# Patient Record
Sex: FEMALE | Race: BLACK OR AFRICAN AMERICAN | Employment: FULL TIME | URBAN - METROPOLITAN AREA
[De-identification: names, ages, dates, MRNs, and addresses within clinical notes are randomized per-mention and may not be internally consistent; named-entity substitution may affect disease eponyms.]

---

## 2017-08-12 ENCOUNTER — HOSPITAL ENCOUNTER (OUTPATIENT)
Dept: MAMMOGRAPHY | Age: 57
Discharge: HOME | End: 2017-08-12
Attending: OBSTETRICS & GYNECOLOGY
Payer: COMMERCIAL

## 2017-08-12 DIAGNOSIS — Z12.31 VISIT FOR SCREENING MAMMOGRAM: ICD-10-CM

## 2017-08-12 PROCEDURE — 77067 SCR MAMMO BI INCL CAD: CPT

## 2017-09-20 ENCOUNTER — OFFICE VISIT (OUTPATIENT)
Dept: ORTHOPEDIC SURGERY | Age: 57
End: 2017-09-20

## 2017-09-20 VITALS
TEMPERATURE: 98.3 F | HEART RATE: 84 BPM | DIASTOLIC BLOOD PRESSURE: 75 MMHG | HEIGHT: 62 IN | BODY MASS INDEX: 38.57 KG/M2 | OXYGEN SATURATION: 98 % | SYSTOLIC BLOOD PRESSURE: 144 MMHG | RESPIRATION RATE: 18 BRPM | WEIGHT: 209.6 LBS

## 2017-09-20 DIAGNOSIS — M54.2 NONSPECIFIC PAIN IN THE NECK REGION: ICD-10-CM

## 2017-09-20 DIAGNOSIS — M54.12 LEFT CERVICAL RADICULOPATHY: Primary | ICD-10-CM

## 2017-09-20 RX ORDER — HYDROGEN PEROXIDE 3 %
SOLUTION, NON-ORAL MISCELLANEOUS DAILY
COMMUNITY

## 2017-09-20 RX ORDER — TOPIRAMATE 25 MG/1
TABLET ORAL
Qty: 60 TAB | Refills: 1 | Status: SHIPPED | OUTPATIENT
Start: 2017-09-20 | End: 2018-08-13 | Stop reason: SINTOL

## 2017-09-20 NOTE — MR AVS SNAPSHOT
Visit Information Date & Time Provider Department Dept. Phone Encounter #  
 9/20/2017  2:30  [address],  [facility], [address] and [facility] [phone] [id number] Follow-up Instructions Return for MRI/CT f/u, med f/u, adjustment. Upcoming Health Maintenance Date Due Hepatitis C Screening 1960 DTaP/Tdap/Td series (1 - Tdap) 12/17/1983 PAP AKA CERVICAL CYTOLOGY 12/17/1983 FOBT Q 1 YEAR AGE 50-75 12/17/2012 INFLUENZA AGE 9 TO ADULT 8/1/2017 BREAST CANCER SCRN MAMMOGRAM 8/12/2019 Allergies as of 9/20/2017  Review Complete On: 9/20/2017 By: Sakshi Elizondo LPN Severity Noted Reaction Type Reactions Gabapentin  09/20/2017    Other (comments) Blurred Vision Pcn [Penicillins]  11/20/2012    Unknown (comments) Percocet [Oxycodone-acetaminophen]  11/20/2012    Rash  
 Sulfa (Sulfonamide Antibiotics)  11/20/2012    Unknown (comments) Current Immunizations  Never Reviewed No immunizations on file. Not reviewed this visit You Were Diagnosed With   
  
 Codes Comments Left cervical radiculopathy    -  Primary ICD-10-CM: M54.12 
ICD-9-CM: 723.4 Nonspecific pain in the neck region     ICD-10-CM: M54.2 ICD-9-CM: 723.1 Vitals BP Pulse Temp Resp Height(growth percentile) Weight(growth percentile) 144/75 84 98.3 °F (36.8 °C) (Oral) 18 5' 2\" (1.575 m) 209 lb 9.6 oz (95.1 kg) SpO2 BMI Smoking Status 98% 38.34 kg/m2 Never Smoker BMI and BSA Data Body Mass Index Body Surface Area  
 38.34 kg/m 2 2.04 m 2 Preferred Pharmacy Pharm[place] [phone] [facility] Your Updated Medication List  
  
   
This list is accurate as of: 9/20/17  3:45 PM.  Always use your most recent med list.  
  
  
  
  
 Anson Rape Take  by mouth. ARMOUR THYROID 15 mg tablet Generic drug:  thyroid (Pork) Take 45 mg by mouth. ibuprofen 800 mg tablet Commonly known as:  MOTRIN Take  by mouth.  
  
 losartan 25 mg tablet Commonly known as:  COZAAR Take 100 mg by mouth daily. MSM PO Take  by mouth. NexIUM 20 mg capsule Generic drug:  esomeprazole Take  by mouth daily. topiramate 25 mg tablet Commonly known as:  TOPAMAX Take 1 tab PO QHS x1 week then may increase to 2 tabs PO QHS ZyrTEC 10 mg Cap Generic drug:  Cetirizine Take  by mouth. Prescriptions Sent to Pharmacy Refills  
 topiramate (TOPAMAX) 25 mg tablet 1 Sig: Take 1 tab PO QHS x1 week then may increase to 2 tabs PO QHS Class: Normal  
 Pharmacy: [address] #: [phone] We Performed the Following AMB POC XRAY, SPINE, CERVICAL; 2 OR 3 [04519 CPT(R)] Follow-up Instructions Return for MRI/CT f/u, med f/u, adjustment. To-Do List   
 09/20/2017 Imaging:  MRI CERV SPINE WO CONT   
  
 09/29/2017 8:00 AM  
  Appointment with [facility] MRI RM 1 at [address] [place] [address] ([phone]) GENERAL INSTRUCTIONS  Bring information (ID card) if you have any medically implanted devices. You will be required to lie still while the procedure is being performed. Remove any jewelry (including body piercing, hairpins) prior to MRI. If you have had a creatinine level drawn within the past 30 days, please bring most recent results to your appt. Bring any films, CD's, and reports related to your study with you on the day of your exam.  This only includes studies done outside of [address] [place] [address], [place] [address], [place], and [facility]. Bring a complete list of all medications you are currently taking to include prescriptions, over-the-counter meds, herbals, vitamins & any dietary supplements. If you were given medications for claustrophobia or anxiety, please arrange to have someone drive you to your appointment.   QUESTIONS Notify the MRI Department if you have any questions concerning your study. Mora Iván  [phone] [address] Mitali Aggarwal - [phone] Referral Information Referral ID Referred By Referred To  
  
 [id number] [place] Not Available Visits Status Start Date End Date 1 New Request 17 If your referral has a status of pending review or denied, additional information will be sent to support the outcome of this decision. Patient Instructions MyChart Activation Thank you for requesting access to [employer]. Please follow the instructions below to securely access and download your online medical record. [employer] allows you to send messages to your doctor, view your test results, renew your prescriptions, schedule appointments, and more. How Do I Sign Up? 1. In your internet browser, go to [email or web address] 
2. Click on the First Time User? Click Here link in the Sign In box. You will be redirect to the New Member Sign Up page. 3. Enter your [employer] Access Code exactly as it appears below. You will not need to use this code after youve completed the sign-up process. If you do not sign up before the expiration date, you must request a new code. [employer] Access Code: [email or web address] Expires: 2017 10:21 AM (This is the date your [employer] access code will ) 4. Enter the last four digits of your Social Security Number (xxxx) and Date of Birth (mm/dd/yyyy) as indicated and click Submit. You will be taken to the next sign-up page. 5. Create a [facility] ID. This will be your [employer] login ID and cannot be changed, so think of one that is secure and easy to remember. 6. Create a [employer] password. You can change your password at any time. 7. Enter your Password Reset Question and Answer. This can be used at a later time if you forget your password. 8. Enter your e-mail address.  You will receive e-mail notification when new information is available in [employer]. 9. Click Sign Up. You can now view and download portions of your medical record. 10. Click the Download Summary menu link to download a portable copy of your medical information. Additional Information If you have questions, please visit the Frequently Asked Questions section of the [employer] website at [email or web address] Remember, [employer] is NOT to be used for urgent needs. For medical emergencies, dial 911. Neuropathic Pain: Care Instructions Your Care Instructions Neuropathic pain is caused by pressure on or damage to your nerves. It's often simply called nerve pain. Some people feel this type of pain all the time. For others, it comes and goes. Diabetes, shingles, or an injury can cause nerve pain. Many people say the pain feels sharp, burning, or stabbing. But some people feel it as a dull ache. In some cases, it makes your skin very sensitive. So touch, pressure, and other sensations that did not hurt before may now cause pain. It's important to know that this kind of pain is real and can affect your quality of life. It's also important to know that treatment can help. Treatment includes pain medicines, exercise, and physical therapy. Medicines can help reduce the number of pain signals that travel over the nerves. This can make the painful areas less sensitive. It can also help you sleep better and improve your mood. But medicines are only one part of successful treatment. Most people do best with more than one kind of treatment. Your doctor may recommend that you try cognitive-behavioral therapy and stress management. Or, if needed, you may decide to try to quit smoking, lower your blood pressure, or better control blood sugar. These kinds of healthy changes can also make a difference. If you feel that your treatment is not working, talk to your doctor.  And be sure to tell your doctor if you think you might be depressed or anxious. These are common problems that can also be treated. Follow-up care is a key part of your treatment and safety. Be sure to make and go to all appointments, and call your doctor if you are having problems. It's also a good idea to know your test results and keep a list of the medicines you take. How can you care for yourself at home? · Be safe with medicines. Read and follow all instructions on the label. ¨ If the doctor gave you a prescription medicine for pain, take it as prescribed. ¨ If you are not taking a prescription pain medicine, ask your doctor if you can take an over-the-counter medicine. · Save hard tasks for days when you have less pain. Follow a hard task with an easy task. And remember to take breaks. · Relax, and reduce stress. You may want to try deep breathing or meditation. These can help. · Keep moving. Gentle, daily exercise can help reduce pain. Your doctor or physical therapist can tell you what type of exercise is best for you. This may include walking, swimming, and stationary biking. It may also include stretches and range-of-motion exercises. · Try heat, cold packs, and massage. · Get enough sleep. Constant pain can make you more tired. If the pain makes it hard to sleep, talk with your doctor. · Think positively. Your thoughts can affect your pain. Do fun things to distract yourself from the pain. See a movie, read a book, listen to music, or spend time with a friend. · Keep a pain diary. Try to write down how strong your pain is and what it feels like. Also try to notice and write down how your moods, thoughts, sleep, activities, and medicine affect your pain. These notes can help you and your doctor find the best ways to treat your pain. Reducing constipation caused by pain medicine Pain medicines often cause constipation. To reduce constipation: 
· Include fruits, vegetables, beans, and whole grains in your diet each day. These foods are high in fiber. · Drink plenty of fluids, enough so that your urine is light yellow or clear like water. If you have kidney, heart, or liver disease and have to limit fluids, talk with your doctor before you increase the amount of fluids you drink. · Get some exercise every day. Build up slowly to 30 to 60 minutes a day on 5 or more days of the week. · Take a fiber supplement, such as Citrucel or Metamucil, every day if needed. Read and follow all instructions on the label. · Schedule time each day for a bowel movement. Having a daily routine may help. Take your time and do not strain when having a bowel movement. · Ask your doctor about a laxative. The goal is to have one easy bowel movement every 1 to 2 days. Do not let constipation go untreated for more than 3 days. When should you call for help? Call your doctor now or seek immediate medical care if: 
· You feel sad, anxious, or hopeless for more than a few days. This could mean you are depressed. Depression is common in people who have a lot of pain. But it can be treated. · You have trouble with bowel movements, such as: 
¨ No bowel movement in 3 days. ¨ Blood in the anal area, in your stool, or on the toilet paper. ¨ Diarrhea for more than 24 hours. Watch closely for changes in your health, and be sure to contact your doctor if: 
· Your pain is getting worse. · You can't sleep because of pain. · You are very worried or anxious about your pain. · You have trouble taking your pain medicine. · You have any concerns about your pain medicine or its side effects. · You have vomiting or cramps for more than 2 hours. Where can you learn more? Go to http://pj-adam.info/. Enter V772 in the search box to learn more about \"Neuropathic Pain: Care Instructions. \" Current as of: October 14, 2016 Content Version: 11.3 © 9816-3756 [employer].  Care instructions adapted under license by [address] (which disclaims liability or warranty for this information). If you have questions about a medical condition or this instruction, always ask your healthcare professional. [address] any warranty or liability for your use of this information. Introducing [facility]! Mayur Millertrell introduces [employer] patient portal. Now you can access parts of your medical record, email your doctor's office, and request medication refills online. 1. In your internet browser, go to [email or web address] 2. Click on the First Time User? Click Here link in the Sign In box. You will see the New Member Sign Up page. 3. Enter your [employer] Access Code exactly as it appears below. You will not need to use this code after youve completed the sign-up process. If you do not sign up before the expiration date, you must request a new code. · [employer] Access Code: [email or web address] Expires: 11/5/2017 10:21 AM 
 
4. Enter the last four digits of your Social Security Number (xxxx) and Date of Birth (mm/dd/yyyy) as indicated and click Submit. You will be taken to the next sign-up page. 5. Create a [employer] ID. This will be your [employer] login ID and cannot be changed, so think of one that is secure and easy to remember. 6. Create a [employer] password. You can change your password at any time. 7. Enter your Password Reset Question and Answer. This can be used at a later time if you forget your password. 8. Enter your e-mail address. You will receive e-mail notification when new information is available in [address] 9. Click Sign Up. You can now view and download portions of your medical record. 10. Click the Download Summary menu link to download a portable copy of your medical information. If you have questions, please visit the Frequently Asked Questions section of the [employer] website.  Remember, [employer] is NOT to be used for urgent needs. For medical emergencies, dial 911. Now available from your iPhone and Android! Please provide this summary of care documentation to your next provider. Your primary care clinician is listed as Boo Cantrell. If you have any questions after today's visit, please call [phone].

## 2017-09-20 NOTE — PATIENT INSTRUCTIONS
[employer] Activation    Thank you for requesting access to [employer]. Please follow the instructions below to securely access and download your online medical record. [employer] allows you to send messages to your doctor, view your test results, renew your prescriptions, schedule appointments, and more. How Do I Sign Up? 1. In your internet browser, go to [email or web address]  2. Click on the First Time User? Click Here link in the Sign In box. You will be redirect to the New Member Sign Up page. 3. Enter your [employer] Access Code exactly as it appears below. You will not need to use this code after youve completed the sign-up process. If you do not sign up before the expiration date, you must request a new code. [employer] Access Code: [email or web address]  Expires: 2017 10:21 AM (This is the date your [employer] access code will )    4. Enter the last four digits of your Social Security Number (xxxx) and Date of Birth (mm/dd/yyyy) as indicated and click Submit. You will be taken to the next sign-up page. 5. Create a [employer] ID. This will be your [employer] login ID and cannot be changed, so think of one that is secure and easy to remember. 6. Create a [employer] password. You can change your password at any time. 7. Enter your Password Reset Question and Answer. This can be used at a later time if you forget your password. 8. Enter your e-mail address. You will receive e-mail notification when new information is available in [address]. 9. Click Sign Up. You can now view and download portions of your medical record. 10. Click the Download Summary menu link to download a portable copy of your medical information. Additional Information    If you have questions, please visit the Frequently Asked Questions section of the [employer] website at [email or web address] Remember, [employer] is NOT to be used for urgent needs. For medical emergencies, dial 911.          Neuropathic Pain: Care Instructions  Your Care Instructions  Neuropathic pain is caused by pressure on or damage to your nerves. It's often simply called nerve pain. Some people feel this type of pain all the time. For others, it comes and goes. Diabetes, shingles, or an injury can cause nerve pain. Many people say the pain feels sharp, burning, or stabbing. But some people feel it as a dull ache. In some cases, it makes your skin very sensitive. So touch, pressure, and other sensations that did not hurt before may now cause pain. It's important to know that this kind of pain is real and can affect your quality of life. It's also important to know that treatment can help. Treatment includes pain medicines, exercise, and physical therapy. Medicines can help reduce the number of pain signals that travel over the nerves. This can make the painful areas less sensitive. It can also help you sleep better and improve your mood. But medicines are only one part of successful treatment. Most people do best with more than one kind of treatment. Your doctor may recommend that you try cognitive-behavioral therapy and stress management. Or, if needed, you may decide to try to quit smoking, lower your blood pressure, or better control blood sugar. These kinds of healthy changes can also make a difference. If you feel that your treatment is not working, talk to your doctor. And be sure to tell your doctor if you think you might be depressed or anxious. These are common problems that can also be treated. Follow-up care is a key part of your treatment and safety. Be sure to make and go to all appointments, and call your doctor if you are having problems. It's also a good idea to know your test results and keep a list of the medicines you take. How can you care for yourself at home? · Be safe with medicines. Read and follow all instructions on the label. ¨ If the doctor gave you a prescription medicine for pain, take it as prescribed.   ¨ If you are not taking a prescription pain medicine, ask your doctor if you can take an over-the-counter medicine. · Save hard tasks for days when you have less pain. Follow a hard task with an easy task. And remember to take breaks. · Relax, and reduce stress. You may want to try deep breathing or meditation. These can help. · Keep moving. Gentle, daily exercise can help reduce pain. Your doctor or physical therapist can tell you what type of exercise is best for you. This may include walking, swimming, and stationary biking. It may also include stretches and range-of-motion exercises. · Try heat, cold packs, and massage. · Get enough sleep. Constant pain can make you more tired. If the pain makes it hard to sleep, talk with your doctor. · Think positively. Your thoughts can affect your pain. Do fun things to distract yourself from the pain. See a movie, read a book, listen to music, or spend time with a friend. · Keep a pain diary. Try to write down how strong your pain is and what it feels like. Also try to notice and write down how your moods, thoughts, sleep, activities, and medicine affect your pain. These notes can help you and your doctor find the best ways to treat your pain. Reducing constipation caused by pain medicine  Pain medicines often cause constipation. To reduce constipation:  · Include fruits, vegetables, beans, and whole grains in your diet each day. These foods are high in fiber. · Drink plenty of fluids, enough so that your urine is light yellow or clear like water. If you have kidney, heart, or liver disease and have to limit fluids, talk with your doctor before you increase the amount of fluids you drink. · Get some exercise every day. Build up slowly to 30 to 60 minutes a day on 5 or more days of the week. · Take a fiber supplement, such as Citrucel or Metamucil, every day if needed. Read and follow all instructions on the label. · Schedule time each day for a bowel movement.  Having a daily routine may help. Take your time and do not strain when having a bowel movement. · Ask your doctor about a laxative. The goal is to have one easy bowel movement every 1 to 2 days. Do not let constipation go untreated for more than 3 days. When should you call for help? Call your doctor now or seek immediate medical care if:  · You feel sad, anxious, or hopeless for more than a few days. This could mean you are depressed. Depression is common in people who have a lot of pain. But it can be treated. · You have trouble with bowel movements, such as:  ¨ No bowel movement in 3 days. ¨ Blood in the anal area, in your stool, or on the toilet paper. ¨ Diarrhea for more than 24 hours. Watch closely for changes in your health, and be sure to contact your doctor if:  · Your pain is getting worse. · You can't sleep because of pain. · You are very worried or anxious about your pain. · You have trouble taking your pain medicine. · You have any concerns about your pain medicine or its side effects. · You have vomiting or cramps for more than 2 hours. Where can you learn more? Go to http://pj-adam.info/. Enter Q660 in the search box to learn more about \"Neuropathic Pain: Care Instructions. \"  Current as of: October 14, 2016  Content Version: 11.3  © 5678-1074 [employer]. Care instructions adapted under license by [employer] (which disclaims liability or warranty for this information). If you have questions about a medical condition or this instruction, always ask your healthcare professional. [place] [address] any warranty or liability for your use of this information.

## 2017-09-20 NOTE — PROGRESS NOTES
PAULINE ENTERED PER DR. Es Chapman AS DOCUMENTED ON BLUE SHEET: Topamax 25 mg Take 1 tab PO QHS x1 week then may increase to 2 tabs PO QHS  Disp 60 1RF

## 2017-09-20 NOTE — PROGRESS NOTES
Edy [facility]ormart[facility].  [address], [address]  [place], [address]  Phone: [phone]  Fax: [phone]        Judy Saba  : 1960  PCP: Skye Whitaker MD  2017    NEW PATIENT      ASSESSMENT AND PLAN     Diagnoses and all orders for this visit:    1. Left cervical radiculopathy    2. Nonspecific pain in the neck region  -     [22801] C Spine 2-3V       1. I recommended hourly positional changes . 2. A cervical MRI was ordered for evaluation of myeloradiculopathy. 3. Restart Topamax 25 mg 2 tabs  ramp. 4. She will continue with ibuprofen as needed. 5. Pt instructed to avoid heavy lifting and overhead activity/lifting. 6.  HEP  Follow-up Disposition: Not on File      CHIEF COMPLAINT  Sara Maynard is seen today for complaints of neck and right arm. HISTORY OF PRESENT ILLNESS  Sara Maynard is a 49 y.o. female. She was last evaluated in  for low back pain. At that time she was on 25 mg Topamax with benefit. She has history of right sided facet joint injections in  and . Pt reports no pain in the lower back since receiving her last injection. Pt complains of frequent neck pain that gradually came on 3-6 months ago. She reports that she has experienced pain in the left arm extending form the hand to the elbow. Pt states that she often feels a cold sensation on the left hand and pain in the 3rd and 4th digits. She reports that occasionally she has difficulty sleeping due to throbbing pain in the left elbow. Pt states that she started to experience right arm symptoms a couple months ago. She admits that she has been dropping things. Of note, she is right handed. Pt denies any numbness in the legs at this time. She admits that she sits in front of a computer throughout her workday. Pt has been using ibuprofen with relief. She no longer takes Topamax.  Denies persistent fevers, chills, weight changes, neurogenic bowel or bladder symptoms. No recent infections/CP/SOB. Pain Assessment  9/20/2017   Location of Pain Neck;Arm   Severity of Pain 1   Quality of Pain Dull;Aching; Sharp   Quality of Pain Comment stabbing   Frequency of Pain Intermittent   Aggravating Factors (No Data)   Aggravating Factors Comment unsure (per patient)   Relieving Factors (No Data)   Relieving Factors Comment Ibuprofen         PAST MEDICAL HISTORY   Past Medical History:   Diagnosis Date    Endocrine disease     Hypothyroid    GERD (gastroesophageal reflux disease)     Hypertension     Thyroid disease        Past Surgical History:   Procedure Laterality Date    HX OOPHORECTOMY Right 07/2011    HX OVARIAN CYST REMOVAL      HX TONSILLECTOMY         MEDICATIONS      Current Outpatient Prescriptions   Medication Sig Dispense Refill    esomeprazole (NEXIUM) 20 mg capsule Take  by mouth daily.  METHYLSULFONYLMETHANE (MSM PO) Take  by mouth.  ibuprofen (MOTRIN) 800 mg tablet Take  by mouth.  ZOLPIDEM TARTRATE (AMBIEN PO) Take  by mouth.  losartan (COZAAR) 25 mg tablet Take 100 mg by mouth daily.  Thyroid, Pork, (ARMOUR THYROID) 15 mg Tab Take 45 mg by mouth.  Cetirizine (ZYRTEC) 10 mg cap Take  by mouth.            ALLERGIES    Allergies   Allergen Reactions    Gabapentin Other (comments)     Blurred Vision    Pcn [Penicillins] Unknown (comments)    Percocet [Oxycodone-Acetaminophen] Rash    Sulfa (Sulfonamide Antibiotics) Unknown (comments)          SOCIAL HISTORY    Social History     Social History    Marital status:      Spouse name: N/A    Number of children: N/A    Years of education: N/A     Social History Main Topics    Smoking status: Never Smoker    Smokeless tobacco: Never Used    Alcohol use No    Drug use: None    Sexual activity: Not Asked     Other Topics Concern    None     Social History Narrative     Social History Narrative      Problem Relation Age of Onset    Heart Attack Mother    Lindsey Noonan Diabetes Father     Stroke Father     Bleeding Prob Father     Breast Cancer Maternal Aunt        REVIEW OF SYSTEMS  Review of Systems   Constitutional: Negative. HENT: Negative for hearing loss. Eyes: Negative for blurred vision and double vision. Respiratory: Negative for cough, shortness of breath and wheezing. Cardiovascular: Negative for chest pain, palpitations and PND. Gastrointestinal: Negative for abdominal pain, heartburn, nausea and vomiting. Genitourinary: Negative. Musculoskeletal: Positive for neck pain. Negative for back pain, falls, joint pain and myalgias. Neurological: Positive for tingling and sensory change. Negative for dizziness and headaches. PHYSICAL EXAMINATION  Visit Vitals    /75    Pulse 84    Temp 98.3 °F (36.8 °C) (Oral)    Resp 18    Ht 5' 2\" (1.575 m)    Wt 209 lb 9.6 oz (95.1 kg)    SpO2 98%    BMI 38.34 kg/m2       Constitutional:  Well developed, well nourished, in no acute distress. Psychiatric: Affect and mood are appropriate. Integumentary: No rashes or abrasions noted on exposed areas. Cardiovascular/Peripheral Vascular: Intact l pulses. No peripheral edema is noted. Lymphatic:  No evidence of lymphedema. No cervical lymphadenopathy. SPINE/MUSCULOSKELETAL EXAM    Cervical spine:  Neck is midline, FF. Pt has a trigger point in the left upper trapezius. No focal atrophy is noted. ROM pain free. Shoulder ROM intact. Negative Spurling's sign. Positive Tinel's sign at the left wrist and elbow. Negative Covarrubias's sign. Sensation grossly intact to light touch. Lumbar spine:   No rash, ecchymosis, or gross obliquity. No fasciculations. No focal atrophy is noted. No pain with hip ROM. No tenderness to palpation at the sciatic notch. SI joints non-tender. Trochanters non tender. Sensation grossly intact to light touch.       MOTOR:      Biceps  Triceps Deltoids Wrist Ext Wrist Flex Hand Intrin   Right +4/5 +4/5 +4/5 +4/5 +4/5 4/5   Left +4/5 +4/5 +4/5 +4/5 +4/5 4/5        Hip Flex  Quads Hamstrings Ankle DF EHL Ankle PF   Right +4/5 +4/5 +4/5 +4/5 +4/5 +4/5   Left +4/5 +4/5 +4/5 +4/5 +4/5 +4/5     DTRs are brisk 2+ in the biceps, triceps and brachioradialis and DTRs are 2+ in the patella and Achilles. No difficulty with tandem gait. Ambulation without assistive device. FWB. RADIOGRAPHS  Cervical spine 2V xray films reviewed:  1) Nonspecific straightening of cervical lordosis. Written by Jennifer Posey, as dictated by Jeannine Martinez MD.    Ms. [facility] may have a reminder for a \"due or due soon\" health maintenance. I have asked that she contact her primary care provider for follow-up on this health maintenance.

## 2017-09-29 ENCOUNTER — HOSPITAL ENCOUNTER (OUTPATIENT)
Age: 57
Discharge: HOME | End: 2017-09-29
Attending: PHYSICAL MEDICINE & REHABILITATION
Payer: COMMERCIAL

## 2017-09-29 DIAGNOSIS — M54.12 LEFT CERVICAL RADICULOPATHY: ICD-10-CM

## 2017-09-29 PROCEDURE — 72141 MRI NECK SPINE W/O DYE: CPT

## 2017-10-04 ENCOUNTER — OFFICE VISIT (OUTPATIENT)
Dept: ORTHOPEDIC SURGERY | Age: 57
End: 2017-10-04

## 2017-10-04 VITALS
HEART RATE: 82 BPM | DIASTOLIC BLOOD PRESSURE: 89 MMHG | BODY MASS INDEX: 38.46 KG/M2 | WEIGHT: 209 LBS | TEMPERATURE: 98.5 F | RESPIRATION RATE: 18 BRPM | OXYGEN SATURATION: 97 % | HEIGHT: 62 IN | SYSTOLIC BLOOD PRESSURE: 149 MMHG

## 2017-10-04 DIAGNOSIS — M54.12 CERVICAL RADICULOPATHY: Primary | ICD-10-CM

## 2017-10-04 NOTE — MR AVS SNAPSHOT
Visit Information Date & Time Provider Department Dept. Phone Encounter #  
 10/4/2017  1:00  [address],  [facility], [address] [facility] [phone] [id number] Follow-up Instructions Return if symptoms worsen or fail to improve. Upcoming Health Maintenance Date Due Hepatitis C Screening 1960 DTaP/Tdap/Td series (1 - Tdap) 12/17/1983 PAP AKA CERVICAL CYTOLOGY 12/17/1983 FOBT Q 1 YEAR AGE 50-75 12/17/2012 INFLUENZA AGE 9 TO ADULT 8/1/2017 BREAST CANCER SCRN MAMMOGRAM 8/12/2019 Allergies as of 10/4/2017  Review Complete On: 10/4/2017 By: Gil Santamaria MD  
  
 Severity Noted Reaction Type Reactions Gabapentin  09/20/2017    Other (comments) Blurred Vision Pcn [Penicillins]  11/20/2012    Unknown (comments) Percocet [Oxycodone-acetaminophen]  11/20/2012    Rash  
 Sulfa (Sulfonamide Antibiotics)  11/20/2012    Unknown (comments) Current Immunizations  Never Reviewed No immunizations on file. Not reviewed this visit You Were Diagnosed With   
  
 Codes Comments Cervical radiculopathy    -  Primary ICD-10-CM: M54.12 
ICD-9-CM: 723.4 Vitals BP Pulse Temp Resp Height(growth percentile) Weight(growth percentile) 149/89 82 98.5 °F (36.9 °C) (Oral) 18 5' 2\" (1.575 m) 209 lb (94.8 kg) SpO2 BMI Smoking Status 97% 38.23 kg/m2 Never Smoker BMI and BSA Data Body Mass Index Body Surface Area  
 38.23 kg/m 2 2.04 m 2 [facility]erred Pharm[place] [facility] [place] [place] [phone] Your Updated Medication List  
  
   
This list is accurate as of: 10/4/17  1:31 PM.  Always use your most recent med list.  
  
  
  
  
 Dorthea Tad Take  by mouth. ARMOUR THYROID 15 mg tablet Generic drug:  thyroid (Pork) Take 45 mg by mouth. ibuprofen 800 mg tablet Commonly known as:  MOTRIN Take  by mouth. losartan 25 mg tablet Commonly known as:  COZAAR Take 100 mg by mouth daily. MSM PO Take  by mouth. NexIUM 20 mg capsule Generic drug:  esomeprazole Take  by mouth daily. topiramate 25 mg tablet Commonly known as:  TOPAMAX Take 1 tab PO QHS x1 week then may increase to 2 tabs PO QHS ZyrTEC 10 mg Cap Generic drug:  Cetirizine Take  by mouth. We Performed the Following REFERRAL TO PHYSICAL THERAPY [BDB84 Custom] Comments:  
 Cervical Radiculitis Trial of manual traction progress to mechanical traction. Evaluate for HTU Follow-up Instructions Return if symptoms worsen or fail to improve. Referral Information Referral ID Referred By Referred To  
  
 [id number] Dee Wade Not Available Visits Status Start Date End Date 1 New Request 10/4/17 10/4/18 If your referral has a status of pending review or denied, additional information will be sent to support the outcome of this decision. Patient Instructions Cervical Disc Disease: Care Instructions Your Care Instructions Cervical disc disease results from damage, disease, or wear and tear to the discs between the bones (vertebra) in your neck. The discs act as shock absorbers for the spine and keep the spine flexible. When a disc is damaged, it can bulge out and press against the nerve roots or spinal cord. This is sometimes called a herniated or \"slipped disc. \" This pressure can cause pain and numbness or tingling in your arms and hands. It can also cause weakness in your legs. An accident can damage a disc and cause it to break open (rupture). Aging and hard physical work can also cause damage to cervical discs. The first treatments for cervical disc disease include physical therapy, special neck exercises, heat, and pain medicine. If these fail, your doctor may inject steroids and pain medicine into your neck.  Surgery is usually done only if other treatments have not worked. Follow-up care is a key part of your treatment and safety. Be sure to make and go to all appointments, and call your doctor if you are having problems. It's also a good idea to know your test results and keep a list of the medicines you take. How can you care for yourself at home? · Take pain medicines exactly as directed. ¨ If the doctor gave you a prescription medicine for pain, take it as prescribed. ¨ If you are not taking a prescription pain medicine, ask your doctor if you can take an over-the-counter medicine. · Don't spend too long in one position. Take short breaks to move around and change positions. · Wear a seat belt and shoulder harness when you are in a car. · Sleep with a pillow under your head and neck that keeps your neck straight. · Follow your doctor's instructions for gentle neck-stretching exercises. · Do not smoke. Smoking can slow healing of your discs. If you need help quitting, talk to your doctor about stop-smoking programs and medicines. These can increase your chances of quitting for good. · Avoid strenuous work or exercise until your doctor says it is okay. When should you call for help? Call 911 anytime you think you may need emergency care. For example, call if: 
· You are unable to move an arm or a leg at all. Call your doctor now or seek immediate medical care if: 
· You have new or worse symptoms in your arms, legs, chest, belly, or buttocks. Symptoms may include: ¨ Numbness or tingling. ¨ Weakness. ¨ Pain. · You lose bladder or bowel control. Watch closely for changes in your health, and be sure to contact your doctor if: 
· You are not getting better as expected. Where can you learn more? Go to http://pj-adam.info/. Enter N118 in the search box to learn more about \"Cervical Disc Disease: Care Instructions. \" Current as of: March 21, 2017 Content Version: 11.3 © 8312-5547 [employer], [employer]. Care instructions adapted under license by [employer] (which disclaims liability or warranty for this information). If you have questions about a medical condition or this instruction, always ask your healthcare professional. [address] any warranty or liability for your use of this information. Introducing [facility]! Montse Gutierrez introduces Hypejar patient portal. Now you can access parts of your medical record, email your doctor's office, and request medication refills online. 1. In your internet browser, go to [email or web address] 2. Click on the First Time User? Click Here link in the Sign In box. You will see the New Member Sign Up page. 3. Enter your Hypejar Access Code exactly as it appears below. You will not need to use this code after youve completed the sign-up process. If you do not sign up before the expiration date, you must request a new code. · Hypejar Access Code: [email or web address] Expires: 11/5/2017 10:21 AM 
 
4. Enter the last four digits of your Social Security Number (xxxx) and Date of Birth (mm/dd/yyyy) as indicated and click Submit. You will be taken to the next sign-up page. 5. Create a Hypejar ID. This will be your Hypejar login ID and cannot be changed, so think of one that is secure and easy to remember. 6. Create a Hypejar password. You can change your password at any time. 7. Enter your Password Reset Question and Answer. This can be used at a later time if you forget your password. 8. Enter your e-mail address. You will receive e-mail notification when new information is available in [address] 9. Click Sign Up. You can now view and download portions of your medical record. 10. Click the Download Summary menu link to download a portable copy of your medical information.  
 
If you have questions, please visit the Frequently Asked Questions section of the KPA. Remember, DigiMeldhart is NOT to be used for urgent needs. For medical emergencies, dial 911. Now available from your iPhone and Android! Please provide this summary of care documentation to your next provider. Your primary care clinician is listed as Gordon Gerardo. If you have any questions after today's visit, please call [phone].

## 2017-10-04 NOTE — PATIENT INSTRUCTIONS
Cervical Disc Disease: Care Instructions  Your Care Instructions    Cervical disc disease results from damage, disease, or wear and tear to the discs between the bones (vertebra) in your neck. The discs act as shock absorbers for the spine and keep the spine flexible. When a disc is damaged, it can bulge out and press against the nerve roots or spinal cord. This is sometimes called a herniated or \"slipped disc. \" This pressure can cause pain and numbness or tingling in your arms and hands. It can also cause weakness in your legs. An accident can damage a disc and cause it to break open (rupture). Aging and hard physical work can also cause damage to cervical discs. The first treatments for cervical disc disease include physical therapy, special neck exercises, heat, and pain medicine. If these fail, your doctor may inject steroids and pain medicine into your neck. Surgery is usually done only if other treatments have not worked. Follow-up care is a key part of your treatment and safety. Be sure to make and go to all appointments, and call your doctor if you are having problems. It's also a good idea to know your test results and keep a list of the medicines you take. How can you care for yourself at home? · Take pain medicines exactly as directed. ¨ If the doctor gave you a prescription medicine for pain, take it as prescribed. ¨ If you are not taking a prescription pain medicine, ask your doctor if you can take an over-the-counter medicine. · Don't spend too long in one position. Take short breaks to move around and change positions. · Wear a seat belt and shoulder harness when you are in a car. · Sleep with a pillow under your head and neck that keeps your neck straight. · Follow your doctor's instructions for gentle neck-stretching exercises. · Do not smoke. Smoking can slow healing of your discs. If you need help quitting, talk to your doctor about stop-smoking programs and medicines.  These can increase your chances of quitting for good. · Avoid strenuous work or exercise until your doctor says it is okay. When should you call for help? Call 911 anytime you think you may need emergency care. For example, call if:  · You are unable to move an arm or a leg at all. Call your doctor now or seek immediate medical care if:  · You have new or worse symptoms in your arms, legs, chest, belly, or buttocks. Symptoms may include:  ¨ Numbness or tingling. ¨ Weakness. ¨ Pain. · You lose bladder or bowel control. Watch closely for changes in your health, and be sure to contact your doctor if:  · You are not getting better as expected. Where can you learn more? Go to http://pj-adam.info/. Enter N118 in the search box to learn more about \"Cervical Disc Disease: Care Instructions. \"  Current as of: March 21, 2017  Content Version: 11.3  © 4422-5388 [employer]. Care instructions adapted under license by [employer] (which disclaims liability or warranty for this information). If you have questions about a medical condition or this instruction, always ask your healthcare professional. [address] any warranty or liability for your use of this information.

## 2017-10-04 NOTE — PROGRESS NOTES
[facility] [address]  [address], [address]  [place], [facility] [address]  Phone: [phone]  Fax: [phone]        Jabier Thompson  : 1960  PCP: Micheal Jason MD    PROGRESS NOTE      ASSESSMENT AND PLAN    Diagnoses and all orders for this visit:    1. Cervical radiculopathy  -     REFERRAL TO PHYSICAL THERAPY    1. Advised to continue HEP. 2. Referral to physical therapy. 3. Discussed cervical epidural as possible treatment option. 4. Recommend pt to take Topamax 50 mg QHS, may wean again as symptoms improve. 5. Continue Tylenol PRN. 6. Urgent symptoms discussed with pt.   7. Released from specialty care to PCP. 8. May have refills through PCP (if agreeable). 9. Given information on cervical DDD. Follow-up Disposition:  Return if symptoms worsen or fail to improve. HISTORY OF PRESENT ILLNESS  Forrest Curtis is a 49 y.o. female. Pt presents to the office for a f/u visit for neck pain. Last visit pt was sent to have a cervical spine MRI. Images reviewed with the pt. Stenosis by measurement, images show preserved CSF mantle, no HNP. Pt continues to have neck pain. Pt reports pain now will radiate into her RUE, previously was having pain into her LUE. She states that two days after her office visit, her pain began to go into her RUE, no longer has pain in her LUE. She does have some paresthesias in her hand. Pt notes that she  Has been dropping objects with her hands. Pt continues to have weakness in her UE. Pt states that she has a hard time driving as she has limited ROM in her neck due to pain. She has been experiencing a cold sensation in her RT hand. Pt states she has been using Topamax 25 mg without relief. Pt never increased her Topamax and only took it for a week before she stopped taking it. She is taking Tylenol arthritis two tabs a day with benefit. Pt has not tried traction on her neck.  Denies persistent fevers, chills, weight changes, neurogenic bowel or bladder symptoms. Pt denies recent ED visits or hospitalizations. Pain Assessment  10/4/2017   Location of Pain Neck;Arm   Location Modifiers Right   Severity of Pain 5   Quality of Pain Aching   Quality of Pain Comment -   Frequency of Pain Constant   Aggravating Factors (No Data)   Aggravating Factors Comment weather, sleeping   Relieving Factors (No Data)   Relieving Factors Comment tylenol arthiritis           MRI Results (most recent):    Results from Hospital Encounter encounter on 09/29/17   MRI CERV SPINE WO CONT   Narrative EXAM:  MRI CERV SPINE WO CONT    INDICATION:   Increased neck pain L UE N/T/W    COMPARISON: None. TECHNIQUE: MR imaging of the cervical spine was performed including sagittal T1,  T2, STIR;  axial GRE, T2, T1. Contrast was not administered. FINDINGS:  There is normal alignment of the cervical spine. Vertebral body heights are  maintained. Marrow signal is normal. Mild degenerative discogenic disease C4-C5  and C5-C6. The craniocervical junction is intact. The course, caliber, and  signal intensity of the spinal cord are normal.      C2/3:  No significant central canal or foraminal stenosis    C3/4:  No significant central canal or foraminal stenosis. C4/5:  Mild disc osteophyte complex. Moderate left and mild right foraminal  stenosis. Mild narrowing of the central canal, midline AP diameter is 8.8 mm. C5/6:  Mild disc osteophyte complex with small right paracentral disc  protrusion. Mild left and moderate right foraminal stenosis. Mild central canal  stenosis, midline AP diameter is 7.5 mm    C6/7:  No significant central canal or foraminal stenosis. C7/T1:  No significant central canal or foraminal stenosis. Impression IMPRESSION:      Mild disc osteophyte complex with probable small right paracentral disc  protrusion at C5-C6. Moderate right foraminal stenosis.     Moderate left foraminal stenosis secondary to mild osteophyte complex at C4-C5. No significant central canal stenosis. PAST MEDICAL HISTORY   Past Medical History:   Diagnosis Date    Endocrine disease     Hypothyroid    GERD (gastroesophageal reflux disease)     Hypertension     Thyroid disease        Past Surgical History:   Procedure Laterality Date    HX OOPHORECTOMY Right 07/2011    HX OVARIAN CYST REMOVAL      HX TONSILLECTOMY     . MEDICATIONS    Current Outpatient Prescriptions   Medication Sig Dispense Refill    esomeprazole (NEXIUM) 20 mg capsule Take  by mouth daily.  METHYLSULFONYLMETHANE (MSM PO) Take  by mouth.  topiramate (TOPAMAX) 25 mg tablet Take 1 tab PO QHS x1 week then may increase to 2 tabs PO QHS 60 Tab 1    ZOLPIDEM TARTRATE (AMBIEN PO) Take  by mouth.  losartan (COZAAR) 25 mg tablet Take 100 mg by mouth daily.  Thyroid, Pork, (ARMOUR THYROID) 15 mg Tab Take 45 mg by mouth.  Cetirizine (ZYRTEC) 10 mg cap Take  by mouth.  ibuprofen (MOTRIN) 800 mg tablet Take  by mouth. ALLERGIES  Allergies   Allergen Reactions    Gabapentin Other (comments)     Blurred Vision    Pcn [Penicillins] Unknown (comments)    Percocet [Oxycodone-Acetaminophen] Rash    Sulfa (Sulfonamide Antibiotics) Unknown (comments)          SOCIAL HISTORY    Social History     Social History    Marital status:      Spouse name: N/A    Number of children: N/A    Years of education: N/A     Occupational History    Not on file.      Social History Main Topics    Smoking status: Never Smoker    Smokeless tobacco: Never Used    Alcohol use No    Drug use: Not on file    Sexual activity: Not on file     Other Topics Concern    Not on file     Social History Narrative       FAMILY HISTORY  Family History   Problem Relation Age of Onset    Heart Attack Mother     Diabetes Father     Stroke Father     Bleeding Prob Father     Breast Cancer Maternal Aunt        REVIEW OF SYSTEMS  Review of Systems   Constitutional: Negative for chills, fever and weight loss. Respiratory: Negative for shortness of breath. Cardiovascular: Negative for chest pain. Gastrointestinal: Negative for constipation. Negative for fecal incontinence   Genitourinary: Negative for dysuria. Negative for urinary incontinence   Musculoskeletal:        Per HPI   Skin: Negative for rash. Neurological: Positive for tingling and focal weakness. Negative for dizziness, tremors and headaches. Endo/Heme/Allergies: Does not bruise/bleed easily. Psychiatric/Behavioral: The patient does not have insomnia. PHYSICAL EXAMINATION  Visit Vitals    /89    Pulse 82    Temp 98.5 °F (36.9 °C) (Oral)    Resp 18    Ht 5' 2\" (1.575 m)    Wt 209 lb (94.8 kg)    SpO2 97%    BMI 38.23 kg/m2         Accompanied by self. Constitutional:  Well developed, well nourished, in no acute distress. Psychiatric: Affect and mood are appropriate. Integumentary: No rashes or abrasions noted on exposed areas. Cardiovascular/Peripheral Vascular: Intact l pulses. No peripheral edema is noted. Lymphatic:  No evidence of lymphedema. No cervical lymphadenopathy. SPINE/MUSCULOSKELETAL EXAM    Cervical spine:  Neck is midline. Normal muscle tone. No focal atrophy is noted. Shoulder ROM intact. Tenderness to palpation of RT cervical paraspinals. Tenderness to palpation of bilateral upper trapezii. Negative Spurling's sign. Negative Tinel's sign. Negative Covarrubias's sign. Sensation grossly intact to light touch. MOTOR:      Biceps  Triceps Deltoids Wrist Ext Wrist Flex Hand Intrin   Right +4/5 +4/5 +4/5 +4/5 +4/5 +4/5   Left +4/5 +4/5 +4/5 +4/5 +4/5 +4/5     Ambulation without assistive device. FWB. Written by Roosevelt Landers, as dictated by Santina Hammans, MD.    I, Dr. Santina Hammans, MD, confirm that all documentation is accurate.       Ms. Abhishek Lucas may have a reminder for a \"due or due soon\" health maintenance. I have asked that she contact her primary care provider for follow-up on this health maintenance.

## 2017-10-09 ENCOUNTER — HOSPITAL ENCOUNTER (OUTPATIENT)
Dept: PHYSICAL THERAPY | Age: 57
Discharge: HOME | End: 2017-10-09
Payer: COMMERCIAL

## 2017-10-09 PROCEDURE — 97161 PT EVAL LOW COMPLEX 20 MIN: CPT

## 2017-10-09 PROCEDURE — 97110 THERAPEUTIC EXERCISES: CPT

## 2017-10-09 NOTE — PROGRESS NOTES
[facility] - [facility], [address] [place] [address]  [phone] fax  Plan of Care/ Statement of Necessity for Physical Therapy Services     Patient name: Nick Man Start of Care: 10/9/2017   Referral source: Christine Nelson MD : 1960    Medical Diagnosis: Neck pain [M54.2]  Radiculopathy, cervical region [M54.12]   Onset Date: Within 3 months    Treatment Diagnosis: neck pain   Prior Hospitalization: see medical history Provider#: [id number]   Medications: Verified on Patient summary List    Comorbidities: arthritis, high blood pressure, thyroid problems, history of back pain   Prior Level of Function: Functionally independent, lives alone, 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 Assessment/ key information: Patient is a pleasant Right handed 49year old female who presents with complaints of neck pain over the past three months. 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 MRI reveals cervical DDD. Due to her pain Patient reports difficulty with turning her head to drive, pain at night, intermittent tingling in the UEs, and increased pain with having her arms at her sides. At initial evaluation Patient demonstrates the following cervical AROM: flexion 45 (some muscle pull), extension 20 (increased neck/UE pain), rotation Right 35 (pain Right) Left 40, lateral flexion Right 32 Left 18. UE strength grossly 5/5 Left and 4+/5 Right with slight pain. Negative Spurling's compression test. Decreased pain with cervical manual distraction/traction. Tender to palpation in the cervical and upper back musculature, Right more than Left.  Patient is a good rehab candidate based on premorbid status, and will benefit from skilled physical therapy in order to address the above deficits. Evaluation Complexity History MEDIUM  Complexity : 1-2 comorbidities / personal factors will impact the outcome/ POC ; Examination LOW Complexity : 1-2 Standardized tests and measures addressing body structure, function, activity limitation and / or participation in recreation  ;Presentation LOW Complexity : Stable, uncomplicated  ;Clinical Decision Making MEDIUM Complexity : FOTO score of 26-74  Overall Complexity Rating: LOW   Problem List: pain affecting function, decrease ROM, decrease strength, decrease ADL/ functional abilitiies, decrease activity tolerance, decrease flexibility/ joint mobility and decrease transfer abilities   Treatment Plan may include any combination of the following: Therapeutic exercise, Therapeutic activities, Neuromuscular re-education, Physical agent/modality, Manual therapy, Aquatic therapy, Patient education, Self Care training and Other: cervical mechanical traction  Patient / Family readiness to learn indicated by: asking questions, trying to perform skills and interest  Persons(s) to be included in education: patient (P)  Barriers to Learning/Limitations: None  Patient Goal (s): pain relief and/or healing to point where surgery will not be required  Patient Self Reported Health Status: good  Rehabilitation Potential: good    Short Term Goals: To be accomplished in 1 weeks:  Goal: Patient will be independent and compliant with HEP in order to progress toward long term goals. Status at last note/certification: Issued and reviewed  Long Term Goals: To be accomplished in 10 treatments:  Goal: Patient will improve FOTO assessment score to 72 in order to indicate improved functional abilities. Status at last note/certification: 61  Goal: Patient will improve Right shoulder flexion/abduction strength to 5/5 without pain in order to improve ease of assisting father.   Status at last note/certification: 4+/5  Goal: Patient will improve cervical rotation/lateral flexion AROM by at least 10 degrees bilaterally without increase in pain in order to improve ease of driving job tasks. Status at last note/certification: Rotation Right 35 (pain Right) Left 40, lateral flexion Right 32 Left 18  Goal: Patient will report at least 70% improvement overall in order to progress toward personal goals. Status at last note/certification: n/a    Frequency / Duration: Patient to be seen 2 times per week for 10 treatments with increase to 3 times per week if needed. Patient/ Caregiver education and instruction: Diagnosis, prognosis, exercises   [x]  Plan of care has been reviewed with KIARRA Mendez, PT 10/9/2017 9:25 AM  _____________________________________________________________________  I certify that the above Therapy Services are being furnished while the patient is under my care. I agree with the treatment plan and certify that this therapy is necessary.     Physician's Signature:____________________  Date:__________Time:______    Please sign and return to [facility] - [place] [address] [place]  [phone] fax

## 2017-10-09 NOTE — PROGRESS NOTES
PT DAILY TREATMENT NOTE     Patient Name: Christin Solorzano  Date:10/9/2017  : 1960  [x]  Patient  Verified  Payor: Aaron Cota / Plan: [place] [employer]  CAPITATED PT / Product Type: Commerical /    In time:835  Out time:925  Total Treatment Time (min): 50  Visit #: 1 of 10    Treatment Area: Neck pain [M54.2]  Radiculopathy, cervical region [M54.12]    SUBJECTIVE  Pain Level (0-10 scale): 1  Any medication changes, allergies to medications, adverse drug reactions, diagnosis change, or new procedure performed?: [x] No    [] Yes (see summary sheet for update)  Subjective functional status/changes:   [] No changes reported      OBJECTIVE    Modality rationale:    Min Type Additional Details    [] Estim:  []Unatt       []IFC  []Premod                        []Other:  []w/ice   []w/heat  Position:  Location:    [] Estim: []Att    []TENS instruct  []NMES                    []Other:  []w/US   []w/ice   []w/heat  Position:  Location:    []  Traction: [] Cervical       []Lumbar                       [] Prone          []Supine                       []Intermittent   []Continuous Lbs:  [] before manual  [] after manual    []  Ultrasound: []Continuous   [] Pulsed                           []1MHz   []3MHz W/cm2:  Location:    []  Iontophoresis with dexamethasone         Location: [] Take home patch   [] In clinic    []  Ice     []  heat  []  Ice massage  []  Laser   []  Anodyne Position:  Location:    []  Laser with stim  []  Other:  Position:  Location:    []  Vasopneumatic Device Pressure:       [] lo [] med [] hi   Temperature: [] lo [] med [] hi   [] Skin assessment post-treatment:  []intact []redness- no adverse reaction    []redness - adverse reaction:     42 min [x]Eval                  []Re-Eval       8 min Therapeutic Exercise:  [] See flow sheet :HEP   Rationale: increase ROM and increase strength to improve the patients ability to perform daily tasks          With   [] TE   [] TA   [] neuro   [] other: Patient Education: [x] Review HEP    [] Progressed/Changed HEP based on:   [] positioning   [] body mechanics   [] transfers   [] heat/ice application    [] other:      Other Objective/Functional Measures:      Pain Level (0-10 scale) post treatment: 0    ASSESSMENT/Changes in Function:     Patient will continue to benefit from skilled PT services to modify and progress therapeutic interventions, address functional mobility deficits, address ROM deficits, address strength deficits, analyze and address soft tissue restrictions, analyze and cue movement patterns, analyze and modify body mechanics/ergonomics, assess and modify postural abnormalities, address imbalance/dizziness and instruct in home and community integration to attain remaining goals.      [x]  See Plan of Care  []  See progress note/recertification  []  See Discharge Summary         Progress towards goals / Updated goals:  See POC    PLAN  []  Upgrade activities as tolerated     [x]  Continue plan of care  []  Update interventions per flow sheet       []  Discharge due to:_  []  Other:_      Berta Jessica, PT 10/9/2017  9:24 AM    Future Appointments  Date Time Provider Nolan Wang   10/11/2017 4:00  [address] [facility]   10/16/2017 4:00  [address] [facility]   10/18/2017 4:00  [address] [facility]   10/23/2017 4:00 PM Berta Jessica, PT [facility]   10/25/2017 4:30 PM Hannah Nagy, PT [facility]   10/30/2017 4:00 PM Berta Jessica, PT [facility]

## 2017-10-11 ENCOUNTER — HOSPITAL ENCOUNTER (OUTPATIENT)
Dept: PHYSICAL THERAPY | Age: 57
Discharge: HOME | End: 2017-10-11
Payer: COMMERCIAL

## 2017-10-11 PROCEDURE — 97110 THERAPEUTIC EXERCISES: CPT

## 2017-10-11 PROCEDURE — 97140 MANUAL THERAPY 1/> REGIONS: CPT

## 2017-10-11 NOTE — PROGRESS NOTES
PT DAILY TREATMENT NOTE     Patient Name: Epifanio Starkey  Date:10/11/2017  : 1960  [x]  Patient  Verified  Payor: Josee Gracia / Plan: [employer]  CAPITATED PT / Product Type: Commerical /    In time:4:00  Out time:5:15  Total Treatment Time (min): 75  Visit #: 2 of 10    Treatment Area: Neck pain [M54.2]  Radiculopathy, cervical region [M54.12]    SUBJECTIVE  Pain Level (0-10 scale): 4  Any medication changes, allergies to medications, adverse drug reactions, diagnosis change, or new procedure performed?: [x] No    [] Yes (see summary sheet for update)  Subjective functional status/changes:   [] No changes reported  I have some discomfort in m R > L UE today down to elbow    OBJECTIVE    Modality rationale: decrease pain and increase tissue extensibility to improve the patients ability to perform daily tasks   Min Type Additional Details    [] Estim:  []Unatt       []IFC  []Premod                        []Other:  []w/ice   []w/heat  Position:  Location:    [] Estim: []Att    []TENS instruct  []NMES                    []Other:  []w/US   []w/ice   []w/heat  Position:  Location:    []  Traction: [] Cervical       []Lumbar                       [] Prone          []Supine                       []Intermittent   []Continuous Lbs:  [] before manual  [] after manual    []  Ultrasound: []Continuous   [] Pulsed                           []1MHz   []3MHz W/cm2:  Location:    []  Iontophoresis with dexamethasone         Location: [] Take home patch   [] In clinic   10 []  Ice     [x]  heat  []  Ice massage  []  Laser   []  Anodyne Position: supine  Location: cs    []  Laser with stim  []  Other:  Position:  Location:    []  Vasopneumatic Device Pressure:       [] lo [] med [] hi   Temperature: [] lo [] med [] hi   [x] Skin assessment post-treatment:  [x]intact []redness- no adverse reaction    []redness - adverse reaction:       45 min Therapeutic Exercise:  [] See flow sheet :   Rationale: increase ROM and increase strength to improve the patients ability to care for father, ADL's    10 min Manual Therapy:  STM to cs, scalenes and UT; SOR   Rationale: decrease pain, increase ROM and increase tissue extensibility to improve ease of driving work tasks          With   [] TE   [] TA   [] neuro   [] other: Patient Education: [x] Review HEP    [] Progressed/Changed HEP based on:   [] positioning   [] body mechanics   [] transfers   [] heat/ice application    [] other:      Other Objective/Functional Measures: initiated ex program     Pain Level (0-10 scale) post treatment:  1    ASSESSMENT/Changes in Function: First follow-up after eval    Patient will continue to benefit from skilled PT services to modify and progress therapeutic interventions, address functional mobility deficits, address ROM deficits, address strength deficits, analyze and address soft tissue restrictions, analyze and cue movement patterns, analyze and modify body mechanics/ergonomics, assess and modify postural abnormalities and address imbalance/dizziness to attain remaining goals. []  See Plan of Care  []  See progress note/recertification  []  See Discharge Summary         Progress towards goals / Updated goals:  Goal: Patient will be independent and compliant with HEP in order to progress toward long term goals. Status at last note/certification: Issued and reviewed  Met  Long Term Goals: To be accomplished in 10 treatments:  Goal: Patient will improve FOTO assessment score to 72 in order to indicate improved functional abilities. Status at last note/certification: 61  Goal: Patient will improve Right shoulder flexion/abduction strength to 5/5 without pain in order to improve ease of assisting father. Status at last note/certification: 4+/5  Goal: Patient will improve cervical rotation/lateral flexion AROM by at least 10 degrees bilaterally without increase in pain in order to improve ease of driving job tasks.   Status at last note/certification: Rotation Right 35 (pain Right) Left 40, lateral flexion Right 32 Left 18  Goal: Patient will report at least 70% improvement overall in order to progress toward personal goals.   Status at last note/certification: n/a    PLAN  [x]  Upgrade activities as tolerated     []  Continue plan of care  []  Update interventions per flow sheet       []  Discharge due to:_  []  Other:_      Flo Gresham, PTA 10/11/2017  5:13 PM    Future Appointments  Date Time Provider Nolan Wang   10/16/2017 4:00  [address] [facility]   10/18/2017 4:00  [address] [facility]   10/23/2017 4:00 PM Juanjo Narayan, PT [facility]   10/25/2017 4:30 PM Hannah Nagy, PT [facility]   10/30/2017 4:00 PM Juanjo Narayan, PT Andrea Coto

## 2017-10-16 ENCOUNTER — HOSPITAL ENCOUNTER (OUTPATIENT)
Dept: PHYSICAL THERAPY | Age: 57
Discharge: HOME | End: 2017-10-16
Payer: COMMERCIAL

## 2017-10-16 PROCEDURE — 97110 THERAPEUTIC EXERCISES: CPT

## 2017-10-16 NOTE — PROGRESS NOTES
PT DAILY TREATMENT NOTE     Patient Name: Izaiah Vidales  Date:10/16/2017  : 1960  [x]  Patient  Verified  Payor: Shannan Salgado / Plan: [place] [employer]  CAPITATED PT / Product Type: Commerical /    In time:4:00  Out time: 4;55  Total Treatment Time (min): 55  Visit #: 3 of 10    Treatment Area: Neck pain [M54.2]  Radiculopathy, cervical region [M54.12]    SUBJECTIVE  Pain Level (0-10 scale): 1  Any medication changes, allergies to medications, adverse drug reactions, diagnosis change, or new procedure performed?: [x] No    [] Yes (see summary sheet for update)  Subjective functional status/changes:   [] No changes reported  My neck is feeling great, but my R knee is not. I twisted it a week ago at [facility].     OBJECTIVE    Modality rationale: decrease pain to improve the patients ability to improve ease of function   Min Type Additional Details    [] Estim:  []Unatt       []IFC  []Premod                        []Other:  []w/ice   []w/heat  Position:  Location:    [] Estim: []Att    []TENS instruct  []NMES                    []Other:  []w/US   []w/ice   []w/heat  Position:  Location:    []  Traction: [] Cervical       []Lumbar                       [] Prone          []Supine                       []Intermittent   []Continuous Lbs:  [] before manual  [] after manual    []  Ultrasound: []Continuous   [] Pulsed                           []1MHz   []3MHz W/cm2:  Location:    []  Iontophoresis with dexamethasone         Location: [] Take home patch   [] In clinic   10 []  Ice     [x]  heat  []  Ice massage  []  Laser   []  Anodyne Position: supine  Location: neck    []  Laser with stim  []  Other:  Position:  Location:    []  Vasopneumatic Device Pressure:       [] lo [] med [] hi   Temperature: [] lo [] med [] hi   [] Skin assessment post-treatment:  []intact []redness- no adverse reaction    []redness - adverse reaction:       45 min Therapeutic Exercise:  [] See flow sheet :   Rationale: increase ROM and increase strength to improve the patients ability to perform daily tasks,     With   [] TE   [] TA   [] neuro   [] other: Patient Education: [x] Review HEP    [] Progressed/Changed HEP based on:   [] positioning   [] body mechanics   [] transfers   [] heat/ice application    [] other:      Other Objective/Functional Measures:     Pain Level (0-10 scale) post treatment:  0    ASSESSMENT/Changes in Function: has made good progress with decreased neck pain, compliant with HEP    Patient will continue to benefit from skilled PT services to modify and progress therapeutic interventions, address functional mobility deficits, address ROM deficits, address strength deficits, analyze and address soft tissue restrictions, analyze and cue movement patterns, analyze and modify body mechanics/ergonomics, assess and modify postural abnormalities, address imbalance/dizziness and instruct in home and community integration to attain remaining goals. []  See Plan of Care  []  See progress note/recertification  []  See Discharge Summary         Progress towards goals / Updated goals:  Goal: Patient will be independent and compliant with HEP in order to progress toward long term goals. Status at last note/certification: Issued and reviewed  [address] be accomplished in 10 treatments:  Goal: Patient will improve FOTO assessment score to 72 in order to indicate improved functional abilities. Status at last note/certification: 61  Goal: Patient will improve Right shoulder flexion/abduction strength to 5/5 without pain in order to improve ease of assisting father. Status at last note/certification: 4+/5  Goal: Patient will improve cervical rotation/lateral flexion AROM by at least 10 degrees bilaterally without increase in pain in order to improve ease of driving job tasks.   Status at last note/certification: Rotation Right 35 (pain Right) Left 40, lateral flexion Right 32 Left 18  Goal: Patient will report at least 70% improvement overall in order to progress toward personal goals.   Status at last note/certification: n/a       PLAN  [x]  Upgrade activities as tolerated     []  Continue plan of care  []  Update interventions per flow sheet       []  Discharge due to:_  []  Other:_      Eden Ash, KIARRA 10/16/2017  4:08 PM    Future Appointments  Date Time Provider Nolan Wang   10/18/2017 4:00  [address] [facility]   10/23/2017 4:00 PM Pollo Harkins, PT [facility]   10/25/2017 4:30 PM Hannah Nagy, PT [facility]   10/30/2017 4:00 PM Pollo Harkins, PT [facility]

## 2017-10-18 ENCOUNTER — APPOINTMENT (OUTPATIENT)
Dept: PHYSICAL THERAPY | Age: 57
End: 2017-10-18
Payer: COMMERCIAL

## 2017-10-18 ENCOUNTER — HOSPITAL ENCOUNTER (OUTPATIENT)
Dept: GENERAL RADIOLOGY | Age: 57
Discharge: HOME | End: 2017-10-18
Payer: COMMERCIAL

## 2017-10-18 DIAGNOSIS — M25.561 RIGHT KNEE PAIN: ICD-10-CM

## 2017-10-18 PROCEDURE — 73564 X-RAY EXAM KNEE 4 OR MORE: CPT

## 2017-10-23 ENCOUNTER — HOSPITAL ENCOUNTER (OUTPATIENT)
Dept: PHYSICAL THERAPY | Age: 57
Discharge: HOME | End: 2017-10-23
Payer: COMMERCIAL

## 2017-10-23 PROCEDURE — 97112 NEUROMUSCULAR REEDUCATION: CPT

## 2017-10-23 PROCEDURE — 97110 THERAPEUTIC EXERCISES: CPT

## 2017-10-23 NOTE — PROGRESS NOTES
PT DAILY TREATMENT NOTE     Patient Name: Jono Bursn  Date:10/23/2017  : 1960  [x]  Patient  Verified  Payor: Lucy Armas / Plan: [employer]  CAPITATED PT / Product Type: Commerical /    In time:400  Out time:440  Total Treatment Time (min): 40  Visit #: 4 of 10    Treatment Area: Neck pain [M54.2]  Radiculopathy, cervical region [M54.12]    SUBJECTIVE  Pain Level (0-10 scale): 0  Any medication changes, allergies to medications, adverse drug reactions, diagnosis change, or new procedure performed?: [x] No    [] Yes (see summary sheet for update)  Subjective functional status/changes:   [] No changes reported  My neck is feeling a whole lot better, thank goodness.      OBJECTIVE    Modality rationale: Patient declined   Min Type Additional Details    [] Estim:  []Unatt       []IFC  []Premod                        []Other:  []w/ice   []w/heat  Position:  Location:    [] Estim: []Att    []TENS instruct  []NMES                    []Other:  []w/US   []w/ice   []w/heat  Position:  Location:    []  Traction: [] Cervical       []Lumbar                       [] Prone          []Supine                       []Intermittent   []Continuous Lbs:  [] before manual  [] after manual    []  Ultrasound: []Continuous   [] Pulsed                           []1MHz   []3MHz W/cm2:  Location:    []  Iontophoresis with dexamethasone         Location: [] Take home patch   [] In clinic    []  Ice     []  heat  []  Ice massage  []  Laser   []  Anodyne Position:  Location:    []  Laser with stim  []  Other:  Position:  Location:    []  Vasopneumatic Device Pressure:       [] lo [] med [] hi   Temperature: [] lo [] med [] hi   [] Skin assessment post-treatment:  []intact []redness- no adverse reaction    []redness - adverse reaction:     30 min Therapeutic Exercise:  [x] See flow sheet :   Rationale: increase ROM and increase strength to improve the patients ability to perform ADL    10 min Neuromuscular Re-education:  [x]  See flow sheet :body blade, wall activities   Rationale: increase strength and improve coordination  to improve the patients ability to improve scapular, cervical mobility    With   [] TE   [] TA   [] neuro   [] other: Patient Education: [x] Review HEP    [] Progressed/Changed HEP based on:   [] positioning   [] body mechanics   [] transfers   [] heat/ice application    [] other:      Other Objective/Functional Measures: reviewed HEP     Pain Level (0-10 scale) post treatment: 0    ASSESSMENT/Changes in Function: Patient reports feeling near 100% improvement in her neck pain, with no functional deficits. She recently sprained her knee and will follow up with MD on 11/3/17 for the knee; will hold until that time in case neck pain returns but if not will discharge. Patient will continue to benefit from skilled PT services to modify and progress therapeutic interventions, address functional mobility deficits, address ROM deficits, address strength deficits, analyze and address soft tissue restrictions, analyze and cue movement patterns, analyze and modify body mechanics/ergonomics, assess and modify postural abnormalities and instruct in home and community integration to attain remaining goals. []  See Plan of Care  []  See progress note/recertification  []  See Discharge Summary         Progress towards goals / Updated goals:  Goal: Patient will be independent and compliant with HEP in order to progress toward long term goals. Status at last note/certification: Issued and reviewed  Current status: Met  Long Term Goals: To be accomplished in 10 treatments:  Goal: Patient will improve FOTO assessment score to 72 in order to indicate improved functional abilities. Status at last note/certification: 61  Current status: Met, 96  Goal: Patient will improve Right shoulder flexion/abduction strength to 5/5 without pain in order to improve ease of assisting father.   Status at last note/certification: 4+/5  Current status: Progressing, flexion 5/5, abduction 4+/5  Goal: Patient will improve cervical rotation/lateral flexion AROM by at least 10 degrees bilaterally without increase in pain in order to improve ease of driving job tasks. Status at last note/certification: Rotation Right 35 (pain Right) Left 40, lateral flexion Right 32 Left 18  Current status: Progressing, Rotation Right 48 Left 52, Lateral flexion Right 37 Left 20  Goal: Patient will report at least 70% improvement overall in order to progress toward personal goals.   Status at last note/certification: n/a  Current status: Met, 97%    PLAN  []  Upgrade activities as tolerated     []  Continue plan of care  []  Update interventions per flow sheet       []  Discharge due to:_  [x]  Other:_  Hold x 2 weeks to determine if ready for discharge    Berta Jessica PT 10/23/2017  4:03 PM    Future Appointments  Date Time Provider Nolan Wang   10/25/2017 4:30 PM Zaki Nagy, PT [facility] [place] [facility]   10/30/2017 4:00 PM Berta Jessica, PT [facility]

## 2017-10-25 ENCOUNTER — APPOINTMENT (OUTPATIENT)
Dept: PHYSICAL THERAPY | Age: 57
End: 2017-10-25
Payer: COMMERCIAL

## 2017-10-30 ENCOUNTER — APPOINTMENT (OUTPATIENT)
Dept: PHYSICAL THERAPY | Age: 57
End: 2017-10-30
Payer: COMMERCIAL

## 2017-11-06 NOTE — PROGRESS NOTES
[facility] - [facility], [address] [place] [address]  [phone] fax    Discharge Summary    Patient name: Ranjit Virk Start of Care: 10/9/2017   Referral source: Jesika Blackmon MD : 1960                          Medical Diagnosis: Neck pain [M54.2]  Radiculopathy, cervical region [M54.12] Onset Date: Within 3 months                          Treatment Diagnosis: neck pain   Prior Hospitalization: see medical history Provider#: [id number]   Medications: Verified on Patient summary List    Comorbidities: arthritis, high blood pressure, thyroid problems, history of back pain   Prior Level of Function: Functionally independent, lives alone, 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 Status at last note/certification: Issued and reviewed  Current status: Met  Long Term Goals: To be accomplished in 10 treatments:  Goal: Patient will improve FOTO assessment score to 72 in order to indicate improved functional abilities. Status at last note/certification: 61  Current status: Met, 96  Goal: Patient will improve Right shoulder flexion/abduction strength to 5/5 without pain in order to improve ease of assisting father. Status at last note/certification: 4+/5  Current status: Progressing, flexion 5/5, abduction 4+/5  Goal: Patient will improve cervical rotation/lateral flexion AROM by at least 10 degrees bilaterally without increase in pain in order to improve ease of driving job tasks.   Status at last note/certification: Rotation Right 35 (pain Right) Left 40, lateral flexion Right 32 Left 18  Current status: Progressing, Rotation Right 48 Left 52, Lateral flexion Right 37 Left 20  Goal: Patient will report at least 70% improvement overall in order to progress toward personal goals. Status at last note/certification: n/a  Current status: Met, 97%      Assessment/ Summary of Care: Patient attended therapy for neck pain, reporting no pain at most recent session. At this time she has followed up with MD, and reports doing much better. Requests discharge. Should she require further treatment in the future we would be pleased to treat her. Thank you for the referral of this Patient.      RECOMMENDATIONS:  [x]Discontinue therapy: [x]Patient has reached or is progressing toward set goals      []Patient is non-compliant or has abdicated      []Due to lack of appreciable progress towards set [facility], PT 11/6/2017 10:30 AM

## 2017-12-15 ENCOUNTER — OFFICE VISIT (OUTPATIENT)
Dept: ORTHOPEDIC SURGERY | Facility: CLINIC | Age: 57
End: 2017-12-15

## 2017-12-15 VITALS
HEART RATE: 86 BPM | SYSTOLIC BLOOD PRESSURE: 139 MMHG | BODY MASS INDEX: 36.68 KG/M2 | DIASTOLIC BLOOD PRESSURE: 89 MMHG | TEMPERATURE: 98.9 F | WEIGHT: 207 LBS | OXYGEN SATURATION: 98 % | HEIGHT: 63 IN

## 2017-12-15 DIAGNOSIS — G89.29 CHRONIC PAIN OF RIGHT KNEE: ICD-10-CM

## 2017-12-15 DIAGNOSIS — M72.2 PLANTAR FASCIITIS, RIGHT: ICD-10-CM

## 2017-12-15 DIAGNOSIS — M25.561 CHRONIC PAIN OF RIGHT KNEE: ICD-10-CM

## 2017-12-15 DIAGNOSIS — M17.11 PRIMARY OSTEOARTHRITIS OF RIGHT KNEE: Primary | ICD-10-CM

## 2017-12-15 RX ORDER — BETAMETHASONE SODIUM PHOSPHATE AND BETAMETHASONE ACETATE 3; 3 MG/ML; MG/ML
6 INJECTION, SUSPENSION INTRA-ARTICULAR; INTRALESIONAL; INTRAMUSCULAR; SOFT TISSUE ONCE
Qty: 0.5 ML | Refills: 0
Start: 2017-12-15 | End: 2017-12-15

## 2017-12-15 RX ORDER — BUPIVACAINE HYDROCHLORIDE 2.5 MG/ML
4 INJECTION, SOLUTION EPIDURAL; INFILTRATION; INTRACAUDAL ONCE
Qty: 4 ML | Refills: 0
Start: 2017-12-15 | End: 2017-12-15

## 2017-12-15 RX ORDER — FLUTICASONE PROPIONATE 50 MCG
2 SPRAY, SUSPENSION (ML) NASAL DAILY
COMMUNITY

## 2017-12-15 NOTE — PROGRESS NOTES
Patient: Epifanio Starkey                MRN: [id number]       SSN: [id number]  YOB: 1960        AGE: 49 y.o. SEX: female    PCP: Olga Husain MD  12/15/17    Chief Complaint   Patient presents with    Knee Pain     RIGHT     HISTORY:  Epifanio Starkey is a 49 y.o. female who is seen for right knee pain. She states she twisted her right knee when attempting to put groceries in her car in September. She has noted primarily lateral right knee pain since that episode. She states her pain is not constant. She notes some popping and catching. She reports some start up pain and discomfort. She notes difficulty sleeping because of her knee pain. She was previously seen by Dr. Mumtaz Jacob for her neck pain. She reports a h/o right plantar fasciitis. She reports she has been doing some knee exercises that her physical therapist recommended to her. Pain Assessment  12/15/2017   Location of Pain Knee   Location Modifiers Right   Severity of Pain 1   Quality of Pain Sharp;Dull;Aching   Quality of Pain Comment -   Frequency of Pain Intermittent   Aggravating Factors Bending   Aggravating Factors Comment -   Limiting Behavior Yes   Relieving Factors NSAID   Relieving Factors Comment -   Result of Injury Yes   Type of Injury Other (Comment)     Occupation, etc:  Ms. Fermin Gan is  for the [employer]. She lives alone in [facility]. She has a sister that lives nearby and helps her care for their father. Current weight is 209 pounds. She reports she has lost 4 pounds recently. She states she does a lot of walking. She is 5'2\" tall. She is hypertensive. She is not diabetic.      Lab Results   Component Value Date/Time    Hemoglobin A1c 5.5 11/05/2010 09:52 AM     Weight Metrics 12/15/2017 10/4/2017 9/20/2017 6/13/2014 6/4/2014 11/20/2012   Weight 207 lb 209 lb 209 lb 9.6 oz 198 lb 198 lb 200 lb   BMI 36.67 kg/m2 38.23 kg/m2 38.34 kg/m2 36.21 kg/m2 36.21 kg/m2 36.57 kg/m2       Patient Active Problem List   Diagnosis Code    Hypothyroidism E03.9    Palpitations R00.2    Unspecified essential hypertension I10    Left cervical radiculopathy M54.12     REVIEW OF SYSTEMS: All Below are Negative except: See HPI   Constitutional: negative for fever, chills, and weight loss. Cardiovascular: negative for chest pain, claudication, leg swelling, SOB, APONTE   Gastrointestinal: Negative for pain, N/V/C/D, Blood in stool or urine, dysuria,  hematuria, incontinence, pelvic pain. Musculoskeletal: See HPI   Neurological: Negative for dizziness and weakness. Negative for headaches, Visual changes, confusion, seizures   Phychiatric/Behavioral: Negative for depression, memory loss, substance  abuse. Extremities: Negative for hair changes, rash, or skin lesion changes. Hematologic: Negative for bleeding problems, bruising, pallor or swollen lymph  nodes   Peripheral Vascular: No calf pain, no circulation deficits. Social History     Social History    Marital status:      Spouse name: N/A    Number of children: N/A    Years of education: N/A     Occupational History    Not on file. Social History Main Topics    Smoking status: Never Smoker    Smokeless tobacco: Never Used    Alcohol use No    Drug use: Not on file    Sexual activity: Not on file     Other Topics Concern    Not on file     Social History Narrative      Allergies   Allergen Reactions    Gabapentin Other (comments)     Blurred Vision    Pcn [Penicillins] Unknown (comments)    Percocet [Oxycodone-Acetaminophen] Rash    Sulfa (Sulfonamide Antibiotics) Unknown (comments)      Current Outpatient Prescriptions   Medication Sig    fluticasone (FLONASE) 50 mcg/actuation nasal spray 2 Sprays by Both Nostrils route daily.  esomeprazole (NEXIUM) 20 mg capsule Take  by mouth daily.  METHYLSULFONYLMETHANE (MSM PO) Take  by mouth.  losartan (COZAAR) 25 mg tablet Take 100 mg by mouth daily.  Thyroid, Pork, (ARMOUR THYROID) 15 mg Tab Take 45 mg by mouth.  Cetirizine (ZYRTEC) 10 mg cap Take  by mouth.  topiramate (TOPAMAX) 25 mg tablet Take 1 tab PO QHS x1 week then may increase to 2 tabs PO QHS    ibuprofen (MOTRIN) 800 mg tablet Take  by mouth.  ZOLPIDEM TARTRATE (AMBIEN PO) Take  by mouth. No current facility-administered medications for this visit. PHYSICAL EXAMINATION:  Visit Vitals    /89    Pulse 86    Temp 98.9 °F (37.2 °C) (Oral)    Ht 5' 3\" (1.6 m)    Wt 207 lb (93.9 kg)    SpO2 98%    BMI 36.67 kg/m2      ORTHO EXAMINATION:  Examination Right knee Left knee   Skin Intact Intact   Range of motion 115-0 120-0   Effusion - -   Medial joint line tenderness + -   Lateral joint line tenderness - -   Popliteal tenderness - -   Osteophytes palpable + -   Mackenzies - -   Patella crepitus + +   Anterior drawer - -   Lateral laxity - -   Medial laxity - -   Varus deformity - -   Valgus deformity - -   Pretibial edema - -   Calf tenderness - -     PROCEDURE: After discussing treatment options, patient's right knee was injected with 4 cc Marcaine and 1/2 cc Celestone. Chart reviewed for the following:   Tali Streeter MD, have reviewed the History, Physical and updated the Allergic reactions for [address] performed immediately prior to start of procedure:  Tali Streeter MD, have performed the following reviews on Penny Tripathi prior to the start of the procedure:            * Patient was identified by name and date of birth   * Agreement on procedure being performed was verified  * Risks and Benefits explained to the patient  * Procedure site verified and marked as necessary  * Patient was positioned for comfort  * Consent was obtained     Time: 4:12 PM     Date of procedure: 12/15/2017    Procedure performed by:  Dusty Stinson MD    Ms. Mendosa tolerated the procedure well with no complications.     RADIOGRAPHS:  XR RIGHT KNEE 10/18/17  Impression:   No acute abnormalities. Minimal degenerative changes. IMPRESSION:      ICD-10-CM ICD-9-CM    1. Primary osteoarthritis of right knee M17.11 715.16 betamethasone (CELESTONE SOLUSPAN) 6 mg/mL injection      BETAMETHASONE ACETATE & SODIUM PHOSPHATE INJECTION 3 MG EA.      DRAIN/INJECT LARGE JOINT/BURSA      bupivacaine, PF, (MARCAINE, PF,) 0.25 % (2.5 mg/mL) injection   2. Chronic pain of right knee M25.561 719.46 betamethasone (CELESTONE SOLUSPAN) 6 mg/mL injection    G89.29 338.29 BETAMETHASONE ACETATE & SODIUM PHOSPHATE INJECTION 3 MG EA.      DRAIN/INJECT LARGE JOINT/BURSA      bupivacaine, PF, (MARCAINE, PF,) 0.25 % (2.5 mg/mL) injection   3. Plantar fasciitis, right M72.2 728.71      PLAN: After discussing treatment options, patient's right knee was injected with 4 cc Marcaine and 1/2 cc Celestone. We discussed a possible right knee MRI in the future if pain continues. She will follow up as needed. Continue knee strengthening exercises.        Scribed by Raymundo Acuña (Merline Jasper) as dictated by Jamey Burgess MD

## 2017-12-15 NOTE — MR AVS SNAPSHOT
Visit Information Date & Time Provider Department Dept. Phone Encounter #  
 12/15/2017  3:00 PM Nicole Zhao MD [facility]  [phone] [id number] Follow-up Instructions Return if symptoms worsen or fail to improve. Upcoming Health Maintenance Date Due Hepatitis C Screening 1960 DTaP/Tdap/Td series (1 - Tdap) 12/17/1983 PAP AKA CERVICAL CYTOLOGY 12/17/1983 FOBT Q 1 YEAR AGE 50-75 12/17/2012 Influenza Age 5 to Adult 8/1/2017 Allergies as of 12/15/2017  Review Complete On: 10/9/2017 By: Lilliam Wagner, PT Severity Noted Reaction Type Reactions Gabapentin  09/20/2017    Other (comments) Blurred Vision Pcn [Penicillins]  11/20/2012    Unknown (comments) Percocet [Oxycodone-acetaminophen]  11/20/2012    Rash  
 Sulfa (Sulfonamide Antibiotics)  11/20/2012    Unknown (comments) Current Immunizations  Never Reviewed No immunizations on file. Not reviewed this visit You Were Diagnosed With   
  
 Codes Comments Primary osteoarthritis of right knee    -  Primary ICD-10-CM: M17.11 ICD-9-CM: 715.16 Chronic pain of right knee     ICD-10-CM: M25.561, G89.29 ICD-9-CM: 719.46, 338.29 Plantar fasciitis, right     ICD-10-CM: M72.2 ICD-9-CM: 728.71 Vitals BP Pulse Temp Height(growth percentile) Weight(growth percentile) SpO2  
 139/89 86 98.9 °F (37.2 °C) (Oral) 5' 3\" (1.6 m) 207 lb (93.9 kg) 98% BMI Smoking Status 36.67 kg/m2 Never Smoker BMI and BSA Data Body Mass Index Body Surface Area  
 36.67 kg/m 2 2.04 m 2 Preferred Pharmacy Pharmacy Name Phone [facility] [address] [facility] Your Updated Medication List  
  
   
This list is accurate as of: 12/15/17  4:17 PM.  Always use your most recent med list.  
  
  
  
  
 Danny Carol Take  by mouth. ARMOUR THYROID 15 mg tablet Generic drug:  thyroid (Pork) Take 45 mg by mouth. FLONASE 50 mcg/actuation nasal spray Generic drug:  fluticasone 2 Sprays by Both Nostrils route daily. ibuprofen 800 mg tablet Commonly known as:  MOTRIN Take  by mouth.  
  
 losartan 25 mg tablet Commonly known as:  COZAAR Take 100 mg by mouth daily. MSM PO Take  by mouth. NexIUM 20 mg capsule Generic drug:  esomeprazole Take  by mouth daily. topiramate 25 mg tablet Commonly known as:  TOPAMAX Take 1 tab PO QHS x1 week then may increase to 2 tabs PO QHS ZyrTEC 10 mg Cap Generic drug:  Cetirizine Take  by mouth. Follow-up Instructions Return if symptoms worsen or fail to improve. Patient Instructions Knee Arthritis: Exercises Your Care Instructions Here are some examples of exercises for knee arthritis. Start each exercise slowly. Ease off the exercise if you start to have pain. Your doctor or physical therapist will tell you when you can start these exercises and which ones will work best for you. How to do the exercises Knee flexion with heel slide 1. Lie on your back with your knees bent. 2. Slide your heel back by bending your affected knee as far as you can. Then hook your other foot around your ankle to help pull your heel even farther back. 3. Hold for about 6 seconds, then rest for up to 10 seconds. 4. Repeat 8 to 12 times. 5. Switch legs and repeat steps 1 through 4, even if only one knee is sore. [facility] [employer] 1. Sit with your affected leg straight and supported on the floor or a firm bed. Place a small, rolled-up towel under your knee. Your other leg should be bent, with that foot flat on the floor. 2. Tighten the thigh muscles of your affected leg by pressing the back of your knee down into the towel. 3. Hold for about 6 seconds, then rest for up to 10 seconds. 4. Repeat 8 to 12 times. 5. Switch legs and repeat steps 1 through 4, even if only one knee is sore. Straight-leg raises to the front 1. Lie on your back with your good knee bent so that your foot rests flat on the floor. Your affected leg should be straight. Make sure that your low back has a normal curve. You should be able to slip your hand in between the floor and the small of your back, with your palm touching the floor and your back touching the back of your hand. 2. Tighten the thigh muscles in your affected leg by pressing the back of your knee flat down to the floor. Hold your knee straight. 3. Keeping the thigh muscles tight and your leg straight, lift your affected leg up so that your heel is about 12 inches off the floor. Hold for about 6 seconds, then lower slowly. 4. Relax for up to 10 seconds between repetitions. 5. Repeat 8 to 12 times. 6. Switch legs and repeat steps 1 through 5, even if only one knee is sore. Active knee flexion 1. Lie on your stomach with your knees straight. If your kneecap is uncomfortable, roll up a washcloth and put it under your leg just above your kneecap. 2. Lift the foot of your affected leg by bending the knee so that you bring the foot up toward your buttock. If this motion hurts, try it without bending your knee quite as far. This may help you avoid any painful motion. 3. Slowly move your leg up and down. 4. Repeat 8 to 12 times. 5. Switch legs and repeat steps 1 through 4, even if only one knee is sore. Quadriceps stretch (facedown) 1. Lie flat on your stomach, and rest your face on the floor. 2. Wrap a towel or belt strap around the lower part of your affected leg. Then use the towel or belt strap to slowly pull your heel toward your buttock until you feel a stretch. 3. Hold for about 15 to 30 seconds, then relax your leg against the towel or belt strap. 4. Repeat 2 to 4 times. 5. Switch legs and repeat steps 1 through 4, even if only one knee is sore. Stationary exercise bike 1. If you do not have a stationary exercise bike at home, you can find one to ride at your local health club or community center. 2. Adjust the height of the bike seat so that your knee is slightly bent when your leg is extended downward. If your knee hurts when the pedal reaches the top, you can raise the seat so that your knee does not bend as much. 3. Start slowly. At first, try to do 5 to 10 minutes of cycling with little to no resistance. Then increase your time and the resistance bit by bit until you can do 20 to 30 minutes without pain. 4. If you start to have pain, rest your knee until your pain gets back to the level that is normal for you. Or cycle for less time or with less effort. Follow-up care is a key part of your treatment and safety. Be sure to make and go to all appointments, and call your doctor if you are having problems. It's also a good idea to know your test results and keep a list of the medicines you take. Where can you learn more? Go to http://pj-adam.info/. Enter C159 in the search box to learn more about \"Knee Arthritis: Exercises. \" Current as of: March 21, 2017 Content Version: 11.4 © 7777-1578 [employer]. Care instructions adapted under license by [employer] (which disclaims liability or warranty for this information). If you have questions about a medical condition or this instruction, always ask your healthcare professional. [place] [address] any warranty or liability for your use of this information. Joint Injections: Care Instructions Your Care Instructions Joint injections are shots into a joint, such as the knee. They may be used to put in medicines, such as pain relievers. Or they can be used to take out fluid. Sometimes the fluid is tested in a lab. This can help find the cause of a joint problem.  
A corticosteroid, or steroid, shot is used to reduce inflammation in tendons or joints. It is often used to treat problems such as arthritis, tendinitis, and bursitis. Steroids can be injected directly into a painful, inflamed joint. They can also help reduce inflammation of a bursa. A bursa is a sac of fluid. It cushions and lubricates areas where tendons, ligaments, skin, muscles, or bones rub against each other. A steroid shot can sometimes help with short-term pain relief when other treatments haven't worked. If steroid shots help, pain may improve for weeks or months. Follow-up care is a key part of your treatment and safety. Be sure to make and go to all appointments, and call your doctor if you are having problems. It's also a good idea to know your test results and keep a list of the medicines you take. How can you care for yourself at home? · Put ice or a cold pack on the area for 10 to 20 minutes at a time. Put a thin cloth between the ice and your skin. · Take anti-inflammatory medicines to reduce pain, swelling, or inflammation. These include ibuprofen (Advil, Motrin) and naproxen (Aleve). Read and follow all instructions on the label. · Avoid strenuous activities for several days, especially those that put stress on the area where you got the shot. · If you have dressings over the area, keep them clean and dry. You may remove them when your doctor tells you to. When should you call for help? Call your doctor now or seek immediate medical care if: 
? · You have signs of infection, such as: 
¨ Increased pain, swelling, warmth, or redness. ¨ Red streaks leading from the site. ¨ Pus draining from the site. ¨ A fever. ? Watch closely for changes in your health, and be sure to contact your doctor if you have any problems. Where can you learn more? Go to http://pj-adam.info/. Enter N616 in the search box to learn more about \"Joint Injections: Care Instructions. \" Current as of: March 21, 2017 Content Version: 11.4 © 5682-0862 [employer]. Care instructions adapted under license by [employer] (which disclaims liability or warranty for this information). If you have questions about a medical condition or this instruction, always ask your healthcare professional. [address] any warranty or liability for your use of this information. Introducing [facility]! Saidawillian Pierson introduces Infinity Box patient portal. Now you can access parts of your medical record, email your doctor's office, and request medication refills online. 1. In your internet browser, go to [email or web address] 2. Click on the First Time User? Click Here link in the Sign In box. You will see the New Member Sign Up page. 3. Enter your Infinity Box Access Code exactly as it appears below. You will not need to use this code after youve completed the sign-up process. If you do not sign up before the expiration date, you must request a new code. · Infinity Box Access Code: [email or web address] Expires: 2/10/2018  3:02 PM 
 
4. Enter the last four digits of your Social Security Number (xxxx) and Date of Birth (mm/dd/yyyy) as indicated and click Submit. You will be taken to the next sign-up page. 5. Create a Infinity Box ID. This will be your Infinity Box login ID and cannot be changed, so think of one that is secure and easy to remember. 6. Create a Infinity Box password. You can change your password at any time. 7. Enter your Password Reset Question and Answer. This can be used at a later time if you forget your password. 8. Enter your e-mail address. You will receive e-mail notification when new information is available in [address] 9. Click Sign Up. You can now view and download portions of your medical record. 10. Click the Download Summary menu link to download a portable copy of your medical information.  
 
If you have questions, please visit the Frequently Asked Questions section of the [employer]i. Remember, [employer] is NOT to be used for urgent needs. For medical emergencies, dial 911. Now available from your iPhone and Android! Please provide this summary of care documentation to your next provider. Your primary care clinician is listed as Julianne Medina. If you have any questions after today's visit, please call [phone].

## 2017-12-15 NOTE — PATIENT INSTRUCTIONS
Knee Arthritis: Exercises  Your Care Instructions  Here are some examples of exercises for knee arthritis. Start each exercise slowly. Ease off the exercise if you start to have pain. Your doctor or physical therapist will tell you when you can start these exercises and which ones will work best for you. How to do the exercises  Knee flexion with heel slide    1. Lie on your back with your knees bent. 2. Slide your heel back by bending your affected knee as far as you can. Then hook your other foot around your ankle to help pull your heel even farther back. 3. Hold for about 6 seconds, then rest for up to 10 seconds. 4. Repeat 8 to 12 times. 5. Switch legs and repeat steps 1 through 4, even if only one knee is sore. Quad sets    1. Sit with your affected leg straight and supported on the floor or a firm bed. Place a small, rolled-up towel under your knee. Your other leg should be bent, with that foot flat on the floor. 2. Tighten the thigh muscles of your affected leg by pressing the back of your knee down into the towel. 3. Hold for about 6 seconds, then rest for up to 10 seconds. 4. Repeat 8 to 12 times. 5. Switch legs and repeat steps 1 through 4, even if only one knee is sore. Straight-leg raises to the front    1. Lie on your back with your good knee bent so that your foot rests flat on the floor. Your affected leg should be straight. Make sure that your low back has a normal curve. You should be able to slip your hand in between the floor and the small of your back, with your palm touching the floor and your back touching the back of your hand. 2. Tighten the thigh muscles in your affected leg by pressing the back of your knee flat down to the floor. Hold your knee straight. 3. Keeping the thigh muscles tight and your leg straight, lift your affected leg up so that your heel is about 12 inches off the floor. Hold for about 6 seconds, then lower slowly.   4. Relax for up to 10 seconds between repetitions. 5. Repeat 8 to 12 times. 6. Switch legs and repeat steps 1 through 5, even if only one knee is sore. Active knee flexion    1. Lie on your stomach with your knees straight. If your kneecap is uncomfortable, roll up a washcloth and put it under your leg just above your kneecap. 2. Lift the foot of your affected leg by bending the knee so that you bring the foot up toward your buttock. If this motion hurts, try it without bending your knee quite as far. This may help you avoid any painful motion. 3. Slowly move your leg up and down. 4. Repeat 8 to 12 times. 5. Switch legs and repeat steps 1 through 4, even if only one knee is sore. Quadriceps stretch (facedown)    1. Lie flat on your stomach, and rest your face on the floor. 2. Wrap a towel or belt strap around the lower part of your affected leg. Then use the towel or belt strap to slowly pull your heel toward your buttock until you feel a stretch. 3. Hold for about 15 to 30 seconds, then relax your leg against the towel or belt strap. 4. Repeat 2 to 4 times. 5. Switch legs and repeat steps 1 through 4, even if only one knee is sore. Stationary exercise bike    1. If you do not have a stationary exercise bike at home, you can find one to ride at your local health club or community center. 2. Adjust the height of the bike seat so that your knee is slightly bent when your leg is extended downward. If your knee hurts when the pedal reaches the top, you can raise the seat so that your knee does not bend as much. 3. Start slowly. At first, try to do 5 to 10 minutes of cycling with little to no resistance. Then increase your time and the resistance bit by bit until you can do 20 to 30 minutes without pain. 4. If you start to have pain, rest your knee until your pain gets back to the level that is normal for you. Or cycle for less time or with less effort. Follow-up care is a key part of your treatment and safety.  Be sure to make and go to all appointments, and call your doctor if you are having problems. It's also a good idea to know your test results and keep a list of the medicines you take. Where can you learn more? Go to http://pj-adam.info/. Enter C159 in the search box to learn more about \"Knee Arthritis: Exercises. \"  Current as of: March 21, 2017  Content Version: 11.4  © 2006-2017 [employer]. Care instructions adapted under license by [employer] (which disclaims liability or warranty for this information). If you have questions about a medical condition or this instruction, always ask your healthcare professional. [place] [address] any warranty or liability for your use of this information. Joint Injections: Care Instructions  Your Care Instructions  Joint injections are shots into a joint, such as the knee. They may be used to put in medicines, such as pain relievers. Or they can be used to take out fluid. Sometimes the fluid is tested in a lab. This can help find the cause of a joint problem. A corticosteroid, or steroid, shot is used to reduce inflammation in tendons or joints. It is often used to treat problems such as arthritis, tendinitis, and bursitis. Steroids can be injected directly into a painful, inflamed joint. They can also help reduce inflammation of a bursa. A bursa is a sac of fluid. It cushions and lubricates areas where tendons, ligaments, skin, muscles, or bones rub against each other. A steroid shot can sometimes help with short-term pain relief when other treatments haven't worked. If steroid shots help, pain may improve for weeks or months. Follow-up care is a key part of your treatment and safety. Be sure to make and go to all appointments, and call your doctor if you are having problems. It's also a good idea to know your test results and keep a list of the medicines you take. How can you care for yourself at home?   · Put ice or a cold pack on the area for 10 to 20 minutes at a time. Put a thin cloth between the ice and your skin. · Take anti-inflammatory medicines to reduce pain, swelling, or inflammation. These include ibuprofen (Advil, Motrin) and naproxen (Aleve). Read and follow all instructions on the label. · Avoid strenuous activities for several days, especially those that put stress on the area where you got the shot. · If you have dressings over the area, keep them clean and dry. You may remove them when your doctor tells you to. When should you call for help? Call your doctor now or seek immediate medical care if:  ? · You have signs of infection, such as:  ¨ Increased pain, swelling, warmth, or redness. ¨ Red streaks leading from the site. ¨ Pus draining from the site. ¨ A fever. ? Watch closely for changes in your health, and be sure to contact your doctor if you have any problems. Where can you learn more? Go to http://pj-adam.info/. Enter N616 in the search box to learn more about \"Joint Injections: Care Instructions. \"  Current as of: March 21, 2017  Content Version: 11.4  © 5019-2377 [employer]. Care instructions adapted under license by [employer] (which disclaims liability or warranty for this information). If you have questions about a medical condition or this instruction, always ask your healthcare professional. [place] [address] any warranty or liability for your use of this information.

## 2018-06-13 ENCOUNTER — OFFICE VISIT (OUTPATIENT)
Dept: ORTHOPEDIC SURGERY | Age: 58
End: 2018-06-13

## 2018-06-13 VITALS
WEIGHT: 199 LBS | DIASTOLIC BLOOD PRESSURE: 83 MMHG | BODY MASS INDEX: 35.25 KG/M2 | SYSTOLIC BLOOD PRESSURE: 138 MMHG | HEART RATE: 86 BPM | RESPIRATION RATE: 16 BRPM | TEMPERATURE: 98 F

## 2018-06-13 DIAGNOSIS — R20.2 PARESTHESIA OF UPPER EXTREMITY: Primary | ICD-10-CM

## 2018-06-13 DIAGNOSIS — M48.02 CERVICAL SPINAL STENOSIS: ICD-10-CM

## 2018-06-13 PROBLEM — M54.12 CERVICAL RADICULOPATHY: Status: ACTIVE | Noted: 2018-06-13

## 2018-06-13 PROBLEM — E66.01 SEVERE OBESITY (BMI 35.0-39.9): Status: ACTIVE | Noted: 2018-06-13

## 2018-06-13 RX ORDER — TOPIRAMATE 25 MG/1
TABLET ORAL
Qty: 60 TAB | Refills: 2 | Status: SHIPPED | OUTPATIENT
Start: 2018-06-13 | End: 2018-08-13 | Stop reason: SINTOL

## 2018-06-13 RX ORDER — TOPIRAMATE 25 MG/1
25 TABLET ORAL 2 TIMES DAILY
Qty: 60 TAB | Refills: 2 | Status: SHIPPED | OUTPATIENT
Start: 2018-06-13 | End: 2018-06-13 | Stop reason: CLARIF

## 2018-06-13 NOTE — PROGRESS NOTES
[address]  [address], [address]  [place], [address] [place] [address] Street  Phone: [phone]  Fax: [phone]        Tita Gonzalez  : 1960  PCP: Lei Pinzon MD    PROGRESS NOTE      ASSESSMENT AND PLAN    Diagnoses and all orders for this visit:    1. Paresthesia of upper extremity  -     EMG TWO EXTREMITIES UPPER; Future    2. Cervical spinal stenosis  -     EMG TWO EXTREMITIES UPPER; Future  -     topiramate (TOPAMAX) 25 mg tablet; 1 to 2 tabs PO QHS    1. Advised to continue HEP. 2. Restart Topamax. 3. EMG of BUE. 4. Wear wrist splint QHS x 6 weeks. 5. Given information on EMG and exercises for CTS. Follow-up Disposition:  Return for after EMG. HISTORY OF PRESENT ILLNESS  Yoly Broderick is a 56 y.o. female. RHD. Pt was last evaluated 8 month ago for cervical radiculopathy and was sent to PT. In the interim she has had a R knee injection by Dr. Caleb Dacosta. Pt returns to the office today with c/o increasing BUE symptoms, intermittently for the past few months. Pt states that she does feel that her UE symptoms worsened after coming off of Topamax. She has heaviness in her arms. Pt reports pain does radiate into the BUE. She has a constant ache in her elbows. Pt feels weakness and clumsiness in her BUE. She still has neck pain but states that her UE symptoms are worse than her neck. She admits to nocturnal paresthesias. She does get some altered sensation in her hands intermittently. She has never been Dx with CTS but states that she may have had CTS in the past. She has been dropping objects in her hands. She has not noticed any balance issues. Her pain is worse in the morning. Her pain will interrupt her sleep at night. She states that cold temperatures will aggravate her pain. Pt states she has been using Aleve with some relief. Denies persistent fevers, chills, weight changes, neurogenic bowel or bladder symptoms.  Pt denies recent ED visits or hospitalizations.  reviewed. 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 Pain Assessment  6/13/2018   Location of Pain Neck;Arm   Location Modifiers Left;Right   Severity of Pain 2   Quality of Pain Aching   Quality of Pain Comment -   Frequency of Pain Constant   Aggravating Factors (No Data)   Aggravating Factors Comment reaching, twisting    Limiting Behavior -   Relieving Factors NSAID   Relieving Factors Comment aleve takes edge off   Result of Injury -   Type of Injury -       PAST MEDICAL HISTORY   Past Medical History:   Diagnosis Date    Endocrine disease     Hypothyroid    GERD (gastroesophageal reflux disease)     Hypertension     Thyroid disease        Past Surgical History:   Procedure Laterality Date    HX OOPHORECTOMY Right 07/2011    HX OVARIAN CYST REMOVAL      HX TONSILLECTOMY     . MEDICATIONS      Current Outpatient Prescriptions   Medication Sig Dispense Refill    naproxen sodium (ALEVE PO) Take  by mouth.  thyroid, pork, (NATURE-THROID) 48.75 mg tab Take  by mouth.  topiramate (TOPAMAX) 25 mg tablet 1 to 2 tabs PO QHS 60 Tab 2    fluticasone (FLONASE) 50 mcg/actuation nasal spray 2 Sprays by Both Nostrils route daily.  esomeprazole (NEXIUM) 20 mg capsule Take  by mouth daily.  METHYLSULFONYLMETHANE (MSM PO) Take  by mouth.  ibuprofen (MOTRIN) 800 mg tablet Take  by mouth.  ZOLPIDEM TARTRATE (AMBIEN PO) Take  by mouth.  losartan (COZAAR) 25 mg tablet Take 100 mg by mouth daily.  Cetirizine (ZYRTEC) 10 mg cap Take  by mouth.  topiramate (TOPAMAX) 25 mg tablet Take 1 tab PO QHS x1 week then may increase to 2 tabs PO QHS 60 Tab 1    Thyroid, Pork, (ARMOUR THYROID) 15 mg Tab Take 45 mg by mouth.           ALLERGIES    Allergies   Allergen Reactions    Gabapentin Other (comments)     Blurred Vision    Pcn [Penicillins] Unknown (comments)    Percocet [Oxycodone-Acetaminophen] Rash    Sulfa (Sulfonamide Antibiotics) Unknown (comments)          SOCIAL HISTORY    Social History     Social History    Marital status:      Spouse name: N/A    Number of children: N/A    Years of education: N/A     Occupational History    Not on file. Social History Main Topics    Smoking status: Never Smoker    Smokeless tobacco: Never Used    Alcohol use No    Drug use: Not on file    Sexual activity: Not on file     Other Topics Concern    Not on file     Social History Narrative       FAMILY HISTORY    Family History   Problem Relation Age of Onset    Heart Attack Mother     Diabetes Father     Stroke Father     Bleeding Prob Father     Breast Cancer Maternal Aunt        REVIEW OF SYSTEMS  Review of Systems   Constitutional: Negative for chills, fever and weight loss. Respiratory: Negative for shortness of breath. Cardiovascular: Negative for chest pain. Gastrointestinal: Negative for constipation. Negative for fecal incontinence   Genitourinary: Negative for dysuria. Negative for urinary incontinence   Musculoskeletal:        Per HPI   Skin: Negative for rash. Neurological: Positive for tingling, sensory change and focal weakness. Negative for dizziness, tremors and headaches. Endo/Heme/Allergies: Does not bruise/bleed easily. Psychiatric/Behavioral: The patient does not have insomnia. PHYSICAL EXAMINATION  Visit Vitals    /83 (BP 1 Location: Left arm, BP Patient Position: Sitting)    Pulse 86    Temp 98 °F (36.7 °C) (Oral)    Resp 16    Wt 199 lb (90.3 kg)    BMI 35.25 kg/m2         Accompanied by self. Constitutional:  Well developed, well nourished, in no acute distress. Psychiatric: Affect and mood are appropriate. Integumentary: No rashes or abrasions noted on exposed areas. Cardiovascular/Peripheral Vascular: Intact l pulses. No peripheral edema is noted. Lymphatic:  No evidence of lymphedema. No cervical lymphadenopathy. SPINE/MUSCULOSKELETAL EXAM    Cervical spine:  Neck is midline. Normal muscle tone. No focal atrophy is noted. Negative Spurling's sign. Negative Tinel's sign. Negative Covarrubias's sign. Sensation grossly intact to light touch. MOTOR:      Biceps  Triceps Deltoids Wrist Ext Wrist Flex Hand Intrin   Right +4/5 +4/5 +4/5 +4/5 +4/5 4/5   Left +4/5 +4/5 +4/5 +4/5 +4/5 4/5     Weakness R pinch 4/5    DTRs are brisk 2+ biceps, triceps, brachioradialis, patella, and Achilles. No difficulty with tandem gait. Ambulation without assistive device. FWB. Written by Karen Wan, as dictated by Kylah Fletcher MD.    I, Dr. Kylah Fletcher MD, confirm that all documentation is accurate. Ms. Lj Cheung may have a reminder for a \"due or due soon\" health maintenance. I have asked that she contact her primary care provider for follow-up on this health maintenance.

## 2018-06-13 NOTE — PATIENT INSTRUCTIONS
Electromyogram (EMG) and Nerve Conduction Studies: About These Tests  What are they? An electromyogram (EMG) measures the electrical activity of your muscles when you are not using them (at rest) and when you tighten them (muscle contraction). Nerve conduction studies (NCS) measure how well and how fast the nerves can send electrical signals. EMG and nerve conduction studies are often done together. If they are done together, the nerve conduction studies are done before the EMG. Why are they done? You may need an EMG to find diseases that damage your muscles or nerves or to find why you cannot move your muscles (paralysis), why they feel weak, or why they twitch. You may need nerve conduction studies to find damage to the nerves that lead from the brain and spinal cord to the rest of the body (peripheral nervous system). Nerve conduction studies are often used to help find nerve disorders, such as carpal tunnel syndrome. How can you prepare for these tests? · Tell your doctors ALL the medicines, vitamins, supplements, and herbal remedies you take. Some medicines can affect the test results. You may need to stop taking some medicines before you have this test.   ? · If you take aspirin or some other blood thinner, be sure to talk to your doctor. He or she will tell you if you should stop taking it before your test. Make sure that you understand exactly what your doctor wants you to do. ? · Wear loose-fitting clothing. You may be given a hospital gown to wear. ? · The electrodes for the test are attached to your skin. Your skin needs to be clean and free of sprays, oils, creams, and lotions. What happens during the tests? You lie on a table or bed or sit in a reclining chair so your muscles are relaxed. For an EMG:  · Your doctor will insert a needle electrode into a muscle.  This will record the electrical activity while the muscle is at rest. You may feel a quick, sharp pain when the needle electrode is put into a muscle. · Your doctor will ask you to tighten the same muscle slowly and steadily while the electrical activity is recorded. · Your doctor may move the electrode to a different area of the muscle or a different muscle. For nerve conduction studies:  · Your doctor will attach two types of electrodes to your skin. ¨ One type of electrode is placed over a nerve and will give the nerve an electrical pulse. ¨ The other type of electrode is placed over the muscle that the nerve controls. It will record how long it takes the muscle to react to the electrical pulse. · You will be able to feel the electrical pulses. They are small shocks and are safe. What else should you know about these tests? · After an EMG, you may be sore and have a tingling feeling in your muscles for up to 2 days. You may have small bruises or swelling at the needle site. · For an EMG, you may be asked to sign a consent form. Talk to your doctor about any concerns you have about the need for the test, its risks, how it will be done, or what the results will mean. How long do they take? · An EMG may take 30 to 60 minutes. · Nerve conduction tests may take from 15 minutes to 1 hour or more. It depends on how many nerves and muscles your doctor tests. What happens after these tests? · If any of the test areas are sore:  ¨ Put ice or a cold pack on the area for 10 to 20 minutes at a time. Put a thin cloth between the ice and your skin. ¨ Take an over-the-counter pain medicine, such as acetaminophen (Tylenol), ibuprofen (Advil, Motrin), or naproxen (Aleve). Be safe with medicines. Read and follow all instructions on the label. · You will probably be able to go home right away. · You can go back to your usual activities right away. When should you call for help?   Watch closely for changes in your health, and be sure to contact your doctor if:  · Muscle pain from an EMG test gets worse or you have swelling, tenderness, or pus at any of the needle sites. · You have any problems that you think may be from the test.  · You have any questions about the test or have not received your results. Follow-up care is a key part of your treatment and safety. Be sure to make and go to all appointments, and call your doctor if you are having problems. It's also a good idea to keep a list of the medicines you take. Ask your doctor when you can expect to have your test results. Where can you learn more? Go to http://pj-adam.info/. Enter E750 in the search box to learn more about \"Electromyogram (EMG) and Nerve Conduction Studies: About These Tests. \"  Current as of: October 14, 2016  Content Version: 11.4  © 8497-5811 [employer]. Care instructions adapted under license by [employer] (which disclaims liability or warranty for this information). If you have questions about a medical condition or this instruction, always ask your healthcare professional. [address] any warranty or liability for your use of this information. Carpal Tunnel Syndrome: Exercises  Your Care Instructions  Here are some examples of typical rehabilitation exercises for your condition. Start each exercise slowly. Ease off the exercise if you start to have pain. Your doctor or your physical or occupational therapist will tell you when you can start these exercises and which ones will work best for you. Warm-up stretches  When you no longer have pain or numbness, you can do exercises to help prevent carpal tunnel syndrome from coming back. Do not do any stretch or movement that is uncomfortable or painful. 1. Rotate your wrist up, down, and from side to side. Repeat 4 times. 2. Stretch your fingers far apart. Relax them, and then stretch them again. Repeat 4 times. 3. Stretch your thumb by pulling it back gently, holding it, and then releasing it. Repeat 4 times.   How to do the exercises  Prayer stretch    1. Start with your palms together in front of your chest just below your chin. 2. Slowly lower your hands toward your waistline, keeping your hands close to your stomach and your palms together until you feel a mild to moderate stretch under your forearms. 3. Hold for at least 15 to 30 seconds. Repeat 2 to 4 times. Wrist flexor stretch    1. Extend your arm in front of you with your palm up. 2. Bend your wrist, pointing your hand toward the floor. 3. With your other hand, gently bend your wrist farther until you feel a mild to moderate stretch in your forearm. 4. Hold for at least 15 to 30 seconds. Repeat 2 to 4 times. Wrist extensor stretch    1. Repeat steps 1 through 4 of the stretch above, but begin with your extended hand palm down. Follow-up care is a key part of your treatment and safety. Be sure to make and go to all appointments, and call your doctor if you are having problems. It's also a good idea to know your test results and keep a list of the medicines you take. Where can you learn more? Go to http://pj-adam.info/. Enter X499 in the search box to learn more about \"Carpal Tunnel Syndrome: Exercises. \"  Current as of: March 21, 2017  Content Version: 11.4  © 9759-6209 [employer]. Care instructions adapted under license by [employer] (which disclaims liability or warranty for this information). If you have questions about a medical condition or this instruction, always ask your healthcare professional. [place] [address] any warranty or liability for your use of this information.

## 2018-06-13 NOTE — PROGRESS NOTES
Verbal order entered per Dr. Ash Davis as documented on blue sheet:  -topamax 25 mg, 1 to 2 tabs PO QHS, disp 60, 2 RF  -EMG b/l upper extremeties, eval for CTS vs radic, schedule in two weeks

## 2018-06-13 NOTE — MR AVS SNAPSHOT
[address] [facility], [place] [address] [place] [address] 
[phone] Patient: Caitlin Porter MRN: [id number] :1960 Visit Information Date & Time Provider Department Dept. Phone Encounter #  
 2018  1:45 PM Beronica Mccloud MD [facility], [address] [facility] [phone] [id number] Follow-up Instructions Return for after EMG. Upcoming Health Maintenance Date Due Hepatitis C Screening 1960 DTaP/Tdap/Td series (1 - Tdap) 1983 PAP AKA CERVICAL CYTOLOGY 1983 FOBT Q 1 YEAR AGE 50-75 2012 Influenza Age 5 to Adult 2018 BREAST CANCER SCRN MAMMOGRAM 2019 Allergies as of 2018  Review Complete On: 2018 By: Yas Harden Severity Noted Reaction Type Reactions Gabapentin  2017    Other (comments) Blurred Vision Pcn [Penicillins]  2012    Unknown (comments) Percocet [Oxycodone-acetaminophen]  2012    Rash  
 Sulfa (Sulfonamide Antibiotics)  2012    Unknown (comments) Current Immunizations  Never Reviewed No immunizations on file. Not reviewed this visit You Were Diagnosed With   
  
 Codes Comments Paresthesia of upper extremity    -  Primary ICD-10-CM: R20.2 ICD-9-CM: 782.0 Cervical spinal stenosis     ICD-10-CM: M48.02 
ICD-9-CM: 723.0 Vitals BP Pulse Temp Resp Weight(growth percentile) BMI  
 138/83 (BP 1 Location: Left arm, BP Patient Position: Sitting) 86 98 °F (36.7 °C) (Oral) 16 199 lb (90.3 kg) 35.25 kg/m2 Smoking Status Never Smoker BMI and BSA Data Body Mass Index Body Surface Area  
 35.25 kg/m 2 2 m 2 [facility]erred [facility] [id number] [facility] Your Updated Medication List  
  
   
 This list is accurate as of 6/13/18  2:00 PM.  Always use your most recent med list.  
  
  
  
  
 Mirta Osler Take  by mouth. AMBIEN PO Take  by mouth. * ARMOUR THYROID 15 mg tablet Generic drug:  thyroid (Pork) Take 45 mg by mouth. * NATURE-THROID 48.75 mg Tab Generic drug:  thyroid (pork) Take  by mouth. FLONASE 50 mcg/actuation nasal spray Generic drug:  fluticasone 2 Sprays by Both Nostrils route daily. ibuprofen 800 mg tablet Commonly known as:  MOTRIN Take  by mouth.  
  
 losartan 25 mg tablet Commonly known as:  COZAAR Take 100 mg by mouth daily. MSM PO Take  by mouth. NexIUM 20 mg capsule Generic drug:  esomeprazole Take  by mouth daily. topiramate 25 mg tablet Commonly known as:  TOPAMAX Take 1 tab PO QHS x1 week then may increase to 2 tabs PO QHS ZyrTEC 10 mg Cap Generic drug:  Cetirizine Take  by mouth. * Notice: This list has 2 medication(s) that are the same as other medications prescribed for you. Read the directions carefully, and ask your doctor or other care provider to review them with you. Follow-up Instructions Return for after EMG. Patient Instructions Electromyogram (EMG) and Nerve Conduction Studies: About These Tests What are they? An electromyogram (EMG) measures the electrical activity of your muscles when you are not using them (at rest) and when you tighten them (muscle contraction). Nerve conduction studies (NCS) measure how well and how fast the nerves can send electrical signals. EMG and nerve conduction studies are often done together. If they are done together, the nerve conduction studies are done before the EMG. Why are they done? You may need an EMG to find diseases that damage your muscles or nerves or to find why you cannot move your muscles (paralysis), why they feel weak, or why they twitch. You may need nerve conduction studies to find damage to the nerves that lead from the brain and spinal cord to the rest of the body (peripheral nervous system). Nerve conduction studies are often used to help find nerve disorders, such as carpal tunnel syndrome. How can you prepare for these tests? · Tell your doctors ALL the medicines, vitamins, supplements, and herbal remedies you take. Some medicines can affect the test results. You may need to stop taking some medicines before you have this test.  
? · If you take aspirin or some other blood thinner, be sure to talk to your doctor. He or she will tell you if you should stop taking it before your test. Make sure that you understand exactly what your doctor wants you to do. ? · Wear loose-fitting clothing. You may be given a hospital gown to wear. ? · The electrodes for the test are attached to your skin. Your skin needs to be clean and free of sprays, oils, creams, and lotions. What happens during the tests? You lie on a table or bed or sit in a reclining chair so your muscles are relaxed. For an EMG: 
· Your doctor will insert a needle electrode into a muscle. This will record the electrical activity while the muscle is at rest. You may feel a quick, sharp pain when the needle electrode is put into a muscle. · Your doctor will ask you to tighten the same muscle slowly and steadily while the electrical activity is recorded. · Your doctor may move the electrode to a different area of the muscle or a different muscle. For nerve conduction studies: 
· Your doctor will attach two types of electrodes to your skin. ¨ One type of electrode is placed over a nerve and will give the nerve an electrical pulse. ¨ The other type of electrode is placed over the muscle that the nerve controls. It will record how long it takes the muscle to react to the electrical pulse. · You will be able to feel the electrical pulses. They are small shocks and are safe. What else should you know about these tests? · After an EMG, you may be sore and have a tingling feeling in your muscles for up to 2 days. You may have small bruises or swelling at the needle site. · For an EMG, you may be asked to sign a consent form. Talk to your doctor about any concerns you have about the need for the test, its risks, how it will be done, or what the results will mean. How long do they take? · An EMG may take 30 to 60 minutes. · Nerve conduction tests may take from 15 minutes to 1 hour or more. It depends on how many nerves and muscles your doctor tests. What happens after these tests? · If any of the test areas are sore: ¨ Put ice or a cold pack on the area for 10 to 20 minutes at a time. Put a thin cloth between the ice and your skin. ¨ Take an over-the-counter pain medicine, such as acetaminophen (Tylenol), ibuprofen (Advil, Motrin), or naproxen (Aleve). Be safe with medicines. Read and follow all instructions on the label. · You will probably be able to go home right away. · You can go back to your usual activities right away. When should you call for help? Watch closely for changes in your health, and be sure to contact your doctor if: · Muscle pain from an EMG test gets worse or you have swelling, tenderness, or pus at any of the needle sites. · You have any problems that you think may be from the test. 
· You have any questions about the test or have not received your results. Follow-up care is a key part of your treatment and safety. Be sure to make and go to all appointments, and call your doctor if you are having problems. It's also a good idea to keep a list of the medicines you take. Ask your doctor when you can expect to have your test results. Where can you learn more? Go to http://pj-adam.info/. Enter M884 in the search box to learn more about \"Electromyogram (EMG) and Nerve Conduction Studies: About These Tests. \" 
 Current as of: October 14, 2016 Content Version: 11.4 © 7091-3651 [employer]. Care instructions adapted under license by [employer] (which disclaims liability or warranty for this information). If you have questions about a medical condition or this instruction, always ask your healthcare professional. [address] any warranty or liability for your use of this information. Carpal Tunnel Syndrome: Exercises Your Care Instructions Here are some examples of typical rehabilitation exercises for your condition. Start each exercise slowly. Ease off the exercise if you start to have pain. Your doctor or your physical or occupational therapist will tell you when you can start these exercises and which ones will work best for you. Warm-up stretches When you no longer have pain or numbness, you can do exercises to help prevent carpal tunnel syndrome from coming back. Do not do any stretch or movement that is uncomfortable or painful. 1. Rotate your wrist up, down, and from side to side. Repeat 4 times. 2. Stretch your fingers far apart. Relax them, and then stretch them again. Repeat 4 times. 3. Stretch your thumb by pulling it back gently, holding it, and then releasing it. Repeat 4 times. How to do the exercises Prayer stretch 1. Start with your palms together in front of your chest just below your chin. 2. Slowly lower your hands toward your waistline, keeping your hands close to your stomach and your palms together until you feel a mild to moderate stretch under your forearms. 3. Hold for at least 15 to 30 seconds. Repeat 2 to 4 times. Wrist flexor stretch 1. Extend your arm in front of you with your palm up. 2. Bend your wrist, pointing your hand toward the floor. 3. With your other hand, gently bend your wrist farther until you feel a mild to moderate stretch in your forearm. 4. Hold for at least 15 to 30 seconds. Repeat 2 to 4 times. Wrist extensor stretch 1. Repeat steps 1 through 4 of the stretch above, but begin with your extended hand palm down. Follow-up care is a key part of your treatment and safety. Be sure to make and go to all appointments, and call your doctor if you are having problems. It's also a good idea to know your test results and keep a list of the medicines you take. Where can you learn more? Go to http://pj-adam.info/. Enter O128 in the search box to learn more about \"Carpal Tunnel Syndrome: Exercises. \" Current as of: March 21, 2017 Content Version: 11.4 © 8087-1594 [employer]. Care instructions adapted under license by [employer] (which disclaims liability or warranty for this information). If you have questions about a medical condition or this instruction, always ask your healthcare professional. [address] any warranty or liability for your use of this information. Introducing [facility]! [facility] introduces [employer] patient portal. Now you can access parts of your medical record, email your doctor's office, and request medication refills online. 1. In your internet browser, go to [email or web address] 2. Click on the First Time User? Click Here link in the Sign In box. You will see the New Member Sign Up page. 3. Enter your [employer] Access Code exactly as it appears below. You will not need to use this code after youve completed the sign-up process. If you do not sign up before the expiration date, you must request a new code. · [employer] Access Code: [email or web address] Expires: 9/11/2018  1:49 PM 
 
4. Enter the last four digits of your Social Security Number (xxxx) and Date of Birth (mm/dd/yyyy) as indicated and click Submit. You will be taken to the next sign-up page. 5. Create a [employer] ID.  This will be your [employer] login ID and cannot be changed, so think of one that is secure and easy to remember. 6. Create a [employer] password. You can change your password at any time. 7. Enter your Password Reset Question and Answer. This can be used at a later time if you forget your password. 8. Enter your e-mail address. You will receive e-mail notification when new information is available in [address] 9. Click Sign Up. You can now view and download portions of your medical record. 10. Click the Download Summary menu link to download a portable copy of your medical information. If you have questions, please visit the Frequently Asked Questions section of the [employer] website. Remember, [employer] is NOT to be used for urgent needs. For medical emergencies, dial 911. Now available from your iPhone and Android! Please provide this summary of care documentation to your next provider. Your primary care clinician is listed as [address] [place] [address]. If you have any questions after today's visit, please call [phone].

## 2018-07-11 ENCOUNTER — OFFICE VISIT (OUTPATIENT)
Dept: ORTHOPEDIC SURGERY | Age: 58
End: 2018-07-11

## 2018-07-11 VITALS
BODY MASS INDEX: 34.91 KG/M2 | WEIGHT: 197 LBS | RESPIRATION RATE: 18 BRPM | DIASTOLIC BLOOD PRESSURE: 77 MMHG | HEIGHT: 63 IN | SYSTOLIC BLOOD PRESSURE: 112 MMHG | HEART RATE: 69 BPM | TEMPERATURE: 98 F

## 2018-07-11 DIAGNOSIS — R20.2 NUMBNESS AND TINGLING IN BOTH HANDS: Primary | ICD-10-CM

## 2018-07-11 DIAGNOSIS — R20.0 NUMBNESS AND TINGLING IN BOTH HANDS: Primary | ICD-10-CM

## 2018-07-11 NOTE — PATIENT INSTRUCTIONS
Tennis Elbow: Care Instructions  Your Care Instructions    Tennis elbow is soreness or pain on the outer part of the elbow. The pain occurs when the tendon is stretched and becomes irritated by repeated twisting of the hand, wrist, and forearm. A tendon is a tough tissue that connects muscle to bone. This injury is common in tennis players. But you also can get it from many activities that work the same muscles. Examples include gardening, painting, and using tools. Tennis elbow usually heals with rest and treatment at home. Follow-up care is a key part of your treatment and safety. Be sure to make and go to all appointments, and call your doctor if you are having problems. It's also a good idea to know your test results and keep a list of the medicines you take. How can you care for yourself at home?    · Rest your fingers, wrist, and forearm. Try to stop or reduce any activity that causes elbow pain. You may have to rest your arm for weeks to months. Follow your doctor's directions for how long to rest.     · Put ice or a cold pack on your elbow for 10 to 20 minutes at a time. Try to do this every 1 to 2 hours for the next 3 days (when you are awake) or until the swelling goes down. Put a thin cloth between the ice and your skin.     · If your doctor gave you a brace or splint, use it as directed. A \"counterforce\" brace is a strap around your forearm, just below your elbow. It may ease the pressure on the tendon and spread force throughout your arm.     · Prop up your elbow on pillows to help reduce swelling.     · Follow your doctor's or physical therapist's directions for exercise.     · Return to your usual activities slowly.     · Try to prevent the problem. Learn the best techniques for your sport. For example, make sure the  on your tennis racquet is not too big for your hand.  Try not to hit a tennis ball late in your swing.     · Think about asking your employer about new ways of doing your job if your elbow pain is caused by something you do at work. Medicines    · Be safe with medicines. Read and follow all instructions on the label. ¨ If the doctor gave you a prescription medicine for pain, take it as prescribed. ¨ If you are not taking a prescription pain medicine, ask your doctor if you can take an over-the-counter medicine. When should you call for help? Call your doctor now or seek immediate medical care if:    · Your pain is worse.     · You cannot bend your elbow normally.     · Your arm or hand is cool or pale or changes color.     · You have tingling, weakness, or numbness in your hand and fingers.    Watch closely for changes in your health, and be sure to contact your doctor if:    · You have work problems caused by your elbow pain.     · Your pain is not better after 2 weeks. Where can you learn more? Go to http://pj-adam.info/. Enter 0699 [id number] [phone] in the search box to learn more about \"Tennis Elbow: Care Instructions. \"  Current as of: November 29, 2017  Content Version: 11.7  © 0521-2446 [email or web address]. Care instructions adapted under license by [employer] (which disclaims liability or warranty for this information). If you have questions about a medical condition or this instruction, always ask your healthcare professional. [address] any warranty or liability for your use of this information. Tennis Elbow: Exercises  Your Care Instructions  Here are some examples of typical rehabilitation exercises for your condition. Start each exercise slowly. Ease off the exercise if you start to have pain. Your doctor or physical therapist will tell you when you can start these exercises and which ones will work best for you. How to do the exercises  Wrist flexor stretch    1. Extend your arm in front of you with your palm up. 2. Bend your wrist, pointing your hand toward the floor.   3. With your other hand, gently bend your wrist farther until you feel a mild to moderate stretch in your forearm. 4. Hold for at least 15 to 30 seconds. Repeat 2 to 4 times. Wrist extensor stretch    1. Repeat steps 1 to 4 of the stretch above but begin with your extended hand palm down. Ball or sock squeeze    1. Hold a tennis ball (or a rolled-up sock) in your hand. 2. Make a fist around the ball (or sock) and squeeze. 3. Hold for about 6 seconds, and then relax for up to 10 seconds. 4. Repeat 8 to 12 times. 5. Switch the ball (or sock) to your other hand and do 8 to 12 times. Wrist deviation    1. Sit so that your arm is supported but your hand hangs off the edge of a flat surface, such as a table. 2. Hold your hand out like you are shaking hands with someone. 3. Move your hand up and down. 4. Repeat this motion 8 to 12 times. 5. Switch arms. 6. Try to do this exercise twice with each hand. Wrist curls    1. Place your forearm on a table with your hand hanging over the edge of the table, palm up. 2. Place a 1- to 2-pound weight in your hand. This may be a dumbbell, a can of food, or a filled water bottle. 3. Slowly raise and lower the weight while keeping your forearm on the table and your palm facing up. 4. Repeat this motion 8 to 12 times. 5. Switch arms, and do steps 1 through 4.  6. Repeat with your hand facing down toward the floor. Switch arms. Biceps curls    1. Sit leaning forward with your legs slightly spread and your left hand on your left thigh. 2. Place your right elbow on your right thigh, and hold the weight with your forearm horizontal.  3. Slowly curl the weight up and toward your chest.  4. Repeat this motion 8 to 12 times. 5. Switch arms, and do steps 1 through 4. Follow-up care is a key part of your treatment and safety. Be sure to make and go to all appointments, and call your doctor if you are having problems.  It's also a good idea to know your test results and keep a list of the medicines you take.  Where can you learn more? Go to http://pj-adam.info/. Enter F021 in the search box to learn more about \"Tennis Elbow: Exercises. \"  Current as of: November 29, 2017  Content Version: 11.7  © 3678-1225 [employer]. Care instructions adapted under license by [employer] (which disclaims liability or warranty for this information). If you have questions about a medical condition or this instruction, always ask your healthcare professional. [address] any warranty or liability for your use of this information.

## 2018-07-11 NOTE — PROGRESS NOTES
[address]  [place], [address] [place] [address] Street  Phone: [phone]  Fax: [phone]        Jyoti Baltazar  : 1960  PCP: aSra Amin MD  2018    ELECTROMYOGRAPHY AND NERVE CONDUCTION STUDIES      ASSESSMENT AND PLAN    Azul Yo comes in to the office today for BUE EMG for c/o increasing BUE symptoms of intermittent paraesthesia in her hands, mostly her fingers over the last few months. She notes she has had instances where she has dropped objects, and has to shake her hands out in the morning. She has found some improvement of her symptoms after using wrist braces at night. She also c/o bilateral elbow pain, R>L. She rates her pain as a 4/10 today. This is an abnormal electrodiagnostic examination. 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 This is characterized by demyelination. There are no signs of active denervation. There are no signs of cervical radiculopathy, brachial plexopathy, peripheral polyneuropathy or myopathy. Her elbow pain is likely tennis elbow/lateral epicondylitis. I provided information and exercises to add to her HEP. Pt will f/u with Dr. Valerie Hayes. HISTORY OF PRESENT ILLNESS  Azul Yo is a 56 y.o. female. Pt c/o  increase of intermittent paraesthesia in her BUE, mostly her fingers. She has found some relief from using wrist braces at night that Dr. Valerie Hayes prescribed. She states she has had instances where she has dropped objects from her right hand and has to shake out her hands in the morning. She did not feel any relief from taking Topamax and states she had a side effect of blurry vision, similar to that she experienced with Gabapentin. She works as a .      PAST MEDICAL HISTORY   Past Medical History:   Diagnosis Date    Endocrine disease     Hypothyroid    GERD (gastroesophageal reflux disease)     Hypertension  Thyroid disease        Past Surgical History:   Procedure Laterality Date    HX OOPHORECTOMY Right 07/2011    HX OVARIAN CYST REMOVAL      HX TONSILLECTOMY     . MEDICATIONS    Current Outpatient Prescriptions   Medication Sig Dispense Refill    naproxen sodium (ALEVE PO) Take  by mouth.  thyroid, pork, (NATURE-THROID) 48.75 mg tab Take  by mouth.  topiramate (TOPAMAX) 25 mg tablet 1 to 2 tabs PO QHS 60 Tab 2    fluticasone (FLONASE) 50 mcg/actuation nasal spray 2 Sprays by Both Nostrils route daily.  esomeprazole (NEXIUM) 20 mg capsule Take  by mouth daily.  METHYLSULFONYLMETHANE (MSM PO) Take  by mouth.  topiramate (TOPAMAX) 25 mg tablet Take 1 tab PO QHS x1 week then may increase to 2 tabs PO QHS 60 Tab 1    ibuprofen (MOTRIN) 800 mg tablet Take  by mouth.  ZOLPIDEM TARTRATE (AMBIEN PO) Take  by mouth.  losartan (COZAAR) 25 mg tablet Take 100 mg by mouth daily.  Thyroid, Pork, (ARMOUR THYROID) 15 mg Tab Take 45 mg by mouth.  Cetirizine (ZYRTEC) 10 mg cap Take  by mouth.             ALLERGIES  Allergies   Allergen Reactions    Gabapentin Other (comments)     Blurred Vision    Pcn [Penicillins] Unknown (comments)    Percocet [Oxycodone-Acetaminophen] Rash    Sulfa (Sulfonamide Antibiotics) Unknown (comments)          SOCIAL HISTORY    Social History     Social History    Marital status:      Spouse name: N/A    Number of children: N/A    Years of education: N/A     Social History Main Topics    Smoking status: Never Smoker    Smokeless tobacco: Never Used    Alcohol use No    Drug use: Not on file    Sexual activity: Not on file     Other Topics Concern    Not on file     Social History Narrative       FAMILY HISTORY  Family History   Problem Relation Age of Onset    Heart Attack Mother     Diabetes Father     Stroke Father     Bleeding Prob Father     Breast Cancer Maternal Aunt          PHYSICAL EXAMINATION  There were no vitals taken for this visit. Pain Assessment  6/13/2018   Location of Pain Neck;Arm   Location Modifiers Left;Right   Severity of Pain 2   Quality of Pain Aching   Quality of Pain Comment -   Frequency of Pain Constant   Aggravating Factors (No Data)   Aggravating Factors Comment reaching, twisting    Limiting Behavior -   Relieving Factors NSAID   Relieving Factors Comment aleve takes edge off   Result of Injury -   Type of Injury -           Constitutional:  Well developed, well nourished, in no acute distress. Psychiatric: Affect and mood are appropriate. Integumentary: No rashes or abrasions noted on exposed areas. SPINE/MUSCULOSKELETAL EXAM    DTRs are 2+ biceps, triceps, brachioradialis, patella, and Achilles. On brief examination: She maintains strength and sensation. MOTOR:      Biceps  Triceps Deltoids Wrist Ext Wrist Flex Hand Intrin   Right 5/5 5/5 5/5 5/5 5/5 5/5   Left 5/5 5/5 5/5 5/5 5/5 5/5             Hip Flex  Quads Hamstrings Ankle DF EHL Ankle PF   Right 5/5 5/5 5/5 5/5 5/5 5/5   Left 5/5 5/5 5/5 5/5 5/5 5/5     NCV & EMG Findings:  Evaluation of the right median sensory nerve showed prolonged distal peak latency (4.0 ms) and decreased conduction velocity (Wrist-2nd Digit, 35 m/s). The left median/radial (dig I) comparison nerve showed prolonged distal peak latency (Median, 3.3 ms) and prolonged distal peak latency (Radial, 3.0 ms). The left median/ulnar (dig IV) comparison nerve showed prolonged distal peak latency (Median Wr, 3.6 ms) and abnormal peak latency difference (Median Wr-Ulnar Wr, 0.5 ms). All remaining nerves (as indicated in the following tables) were within normal limits. Left vs. Right side comparison data for the median sensory nerve indicates abnormal L-R latency difference (0.7 ms). The ulnar sensory nerve indicates abnormal L-R latency difference (0.5 ms). All remaining left vs. right side differences were within normal limits.       All examined muscles (as indicated in the following table) showed no evidence of electrical instability.       Nerve Conduction Studies  Anti Sensory Summary Table     Stim Site NR Peak (ms) Norm Peak (ms) O-P Amp (µV) Norm O-P Amp Site1 Site2 Delta-P (ms) Dist (cm) Joao (m/s) Norm Joao (m/s)   Left Median Anti Sensory (2nd Digit)   Wrist    3.3 <3.6 79.0 >10 Wrist 2nd Digit 3.3 14.0 42 >39   Right Median Anti Sensory (2nd Digit)   Wrist    4.0 <3.6 122.2 >10 Wrist 2nd Digit 4.0 14.0 35 >39   Left Radial Anti Sensory (Base 1st Digit)   Wrist    2.6 <3.1 25.5  Wrist Base 1st Digit 2.6 0.0     Right Radial Anti Sensory (Base 1st Digit)   Wrist    2.7 <3.1 31.5  Wrist Base 1st Digit 2.7 0.0     Left Ulnar Anti Sensory (5th Digit)   Wrist    2.9 <3.7 25.0 >15.0 Wrist 5th Digit 2.9 14.0 48 >38   Right Ulnar Anti Sensory (5th Digit)   Wrist    3.4 <3.7 35.0 >15.0 Wrist 5th Digit 3.4 14.0 41 >38     Motor Summary Table     Stim Site NR Onset (ms) Norm Onset (ms) O-P Amp (mV) Norm O-P Amp Site1 Site2 Delta-0 (ms) Dist (cm) Joao (m/s) Norm Joao (m/s)   Left Median Motor (Abd Poll Brev)   Wrist    3.6 <4.2 10.8 >5 Elbow Wrist 4.2 23.0 55 >50   Elbow    7.8  10.0          Right Median Motor (Abd Poll Brev)   Wrist    4.0 <4.2 11.2 >5 Elbow Wrist 4.3 22.0 51 >50   Elbow    8.3  10.9          Left Ulnar Motor (Abd Dig Min)   Wrist    2.7 <4.2 11.9 >3 B Elbow Wrist 3.5 20.0 57 >53   B Elbow    6.2  11.4  A Elbow B Elbow 1.5 10.0 67 >53   A Elbow    7.7  10.8          Right Ulnar Motor (Abd Dig Min)   Wrist    3.0 <4.2 11.1 >3 B Elbow Wrist 3.4 19.0 56 >53   B Elbow    6.4  10.0  A Elbow B Elbow 1.6 10.0 63 >53   A Elbow    8.0  9.2            Comparison Summary Table     Stim Site NR Peak (ms) Norm Peak (ms) O-P Amp (µV) Site1 Site2 Delta-P (ms) Norm Delta (ms)   Left Median/Radial Dig I Comparison (Digit 1 - 10cm)   Median    3.3 <2.9 21.4 Median Radial 0.3 <0.4   Radial    3.0 <2.8 12.0       Left Median/Ulnar Dig IV Comparison (Digit 4 - 14cm)   Median Wr 3. 6 <3.3 30.5 Median Wr Ulnar Wr 0.5 <0.4   Ulnar Wr    3.1 <3.3 16.4       Left Median/Ulnar Palm Comparison (Wrist - 8cm)   Median Palm    2.1 <2.5 71.0 Median Palm Ulnar Palm 0.3 <0.3   Ulnar Palm    1.8 <2.5 7.9         EMG     Side Muscle Nerve Root Ins Act Fibs Psw Amp Dur Poly Recrt Int Pat Comment   Left Biceps Musculocut C5-6 Nml Nml Nml Nml Nml 0 Nml Nml    Left Triceps Radial C6-7-8 Nml Nml Nml Nml Nml 0 Nml Nml    Left PronatorTeres Median C6-7 Nml Nml Nml Nml Nml 0 Nml Nml    Left Abd Poll Brev Median C8-T1 Nml Nml Nml Nml Nml 0 Nml Nml    Left Abd Dig Min Ulnar C8-T1 Nml Nml Nml Nml Nml 0 Nml Nml    Right Biceps Musculocut C5-6 Nml Nml Nml Nml Nml 0 Nml Nml    Right Triceps Radial C6-7-8 Nml Nml Nml Nml Nml 0 Nml Nml    Right PronatorTeres Median C6-7 Nml Nml Nml Nml Nml 0 Nml Nml    Right Abd Poll Brev Median C8-T1 Nml Nml Nml Nml Nml 0 Nml Nml    Right Abd Dig Min Ulnar C8-T1 Nml Nml Nml Nml Nml 0 Nml Nml    Right Cervical Parasp Up Rami C1-3 Nml Nml Nml         Right Cervical Parasp Mid Rami C4-6 Nml Nml Nml         Right Cervical Parasp Low Rami C7-8 Nml Nml Nml         Left Cervical Parasp Up Rami C1-3 Nml Nml Nml         Left Cervical Parasp Mid Rami C4-6 Nml Nml Nml         Left Cervical Parasp Low Rami C7-8 Nml Nml Nml             Nerve Conduction Studies  Anti Sensory Left/Right Comparison     Stim Site L Lat (ms) R Lat (ms) L-R Lat (ms) L Amp (µV) R Amp (µV) L-R Amp (%) Site1 Site2 L Joao (m/s) R Joao (m/s) L-R Joao (m/s)   Median Anti Sensory (2nd Digit)   Wrist 3.3 4.0 0.7 79.0 122.2 35.4 Wrist 2nd Digit 42 35 7   Radial Anti Sensory (Base 1st Digit)   Wrist 2.6 2.7 0.1 25.5 31.5 19.0 Wrist Base 1st Digit      Ulnar Anti Sensory (5th Digit)   Wrist 2.9 3.4 0.5 25.0 35.0 28.6 Wrist 5th Digit 48 41 7     Motor Left/Right Comparison     Stim Site L Lat (ms) R Lat (ms) L-R Lat (ms) L Amp (mV) R Amp (mV) L-R Amp (%) Site1 Site2 L Joao (m/s) R Joao (m/s) L-R Joao (m/s)   Median Motor (Abd Poll Brev) Wrist 3.6 4.0 0.4 10.8 11.2 3.6 Elbow Wrist 55 51 4   Elbow 7.8 8.3 0.5 10.0 10.9 8.3        Ulnar Motor (Abd Dig Min)   Wrist 2.7 3.0 0.3 11.9 11.1 6.7 B Elbow Wrist 57 56 1   B Elbow 6.2 6.4 0.2 11.4 10.0 12.3 A Elbow B Elbow 67 63 4   A Elbow 7.7 8.0 0.3 10.8 9.2 14.8          Comparison Left/Right Comparison     Stim Site L Lat (ms) R Lat (ms) L-R Lat (ms) L Amp (µV) R Amp (µV) L-R Amp (%)   Median/Radial Dig I Comparison (Digit 1 - 10cm)   Median 3.3   21.4     Radial 3.0   12.0     Median/Ulnar Dig IV Comparison (Digit 4 - 14cm)   Median Wr 3.6   30.5     Ulnar Wr 3.1   16.4     Median/Ulnar Palm Comparison (Wrist - 8cm)   Median Palm 2.1   71.0     Ulnar Palm 1.8   7.9           Waveforms:                                         [facility]  OFFICE PROCEDURE PROGRESS NOTE        Chart reviewed for the following:   Nikole THOMAS, have reviewed the History, Physical and updated the Allergic reactions for [address] performed immediately prior to start of procedure:   Nikole THOMAS, have performed the following reviews on [facility] [place] prior to the start of the procedure:            * Patient was identified by name and date of birth   * Agreement on procedure being performed was verified  * Risks and Benefits explained to the patient  * Procedure site verified and marked as necessary  * Patient was positioned for comfort  * Consent was signed and verified     Time: 4:25      Date of procedure: 7/11/2018    Procedure performed by:  Yaz Bailey MD    Provider assisted by: None. Patient accompanied by: None.     How tolerated by patient: tolerated the procedure well with no complications    Post Procedural Pain Scale: 0 - No Hurt    Comments: none    Written by Kishan Aragon, [facility] as dictated by Nikole Garcia MD

## 2018-08-03 ENCOUNTER — TELEPHONE (OUTPATIENT)
Dept: ORTHOPEDIC SURGERY | Age: 58
End: 2018-08-03

## 2018-08-03 NOTE — TELEPHONE ENCOUNTER
Pt calling because she would like to come into the office today to sign a release form to get her EMG results from July 11th. She would like to pick them up today. Please advise pt at [phone].

## 2018-08-03 NOTE — TELEPHONE ENCOUNTER
EMG performed on 18 by Dr. Karan Bajwa. Returned call to patient, verified , informed patient she may come in to our office to sign release. Patient verbalized agreement/understanding. No further action required at this time.

## 2018-08-13 ENCOUNTER — OFFICE VISIT (OUTPATIENT)
Dept: ORTHOPEDIC SURGERY | Age: 58
End: 2018-08-13

## 2018-08-13 VITALS
WEIGHT: 204 LBS | TEMPERATURE: 98.2 F | OXYGEN SATURATION: 98 % | HEART RATE: 69 BPM | SYSTOLIC BLOOD PRESSURE: 141 MMHG | HEIGHT: 62 IN | RESPIRATION RATE: 16 BRPM | DIASTOLIC BLOOD PRESSURE: 78 MMHG | BODY MASS INDEX: 37.54 KG/M2

## 2018-08-13 DIAGNOSIS — M77.11 LATERAL EPICONDYLITIS OF BOTH ELBOWS: ICD-10-CM

## 2018-08-13 DIAGNOSIS — M77.12 LATERAL EPICONDYLITIS OF BOTH ELBOWS: ICD-10-CM

## 2018-08-13 DIAGNOSIS — M48.02 CERVICAL SPINAL STENOSIS: ICD-10-CM

## 2018-08-13 DIAGNOSIS — G56.03 BILATERAL CARPAL TUNNEL SYNDROME: Primary | ICD-10-CM

## 2018-08-13 RX ORDER — AMLODIPINE BESYLATE 5 MG/1
5 TABLET ORAL DAILY
COMMUNITY

## 2018-08-13 NOTE — MR AVS SNAPSHOT
[address] [place] [address] [address] [place] [id number] 
[phone] Patient: Eve Montemayor MRN: [id number] :1960 Visit Information Date & Time Provider Department Dept. Phone Encounter #  
 2018  2:45 PM Robb Wilcox MD [facility] [phone] [id number] Follow-up Instructions Return if symptoms worsen or fail to improve. Upcoming Health Maintenance Date Due Hepatitis C Screening 1960 DTaP/Tdap/Td series (1 - Tdap) 1983 PAP AKA CERVICAL CYTOLOGY 1983 FOBT Q 1 YEAR AGE 50-75 2012 Influenza Age 5 to Adult 2018 BREAST CANCER SCRN MAMMOGRAM 2019 Allergies as of 2018  Review Complete On: 2018 By: Robb Wilcox MD  
  
 Severity Noted Reaction Type Reactions Gabapentin  2017    Other (comments) Blurred Vision Pcn [Penicillins]  2012    Unknown (comments) Percocet [Oxycodone-acetaminophen]  2012    Rash  
 Sulfa (Sulfonamide Antibiotics)  2012    Unknown (comments) Topamax [Topiramate]  2018    Other (comments) Blurred vision Current Immunizations  Never Reviewed No immunizations on file. Not reviewed this visit You Were Diagnosed With   
  
 Codes Comments Bilateral carpal tunnel syndrome    -  Primary ICD-10-CM: G56.03 
ICD-9-CM: 354.0 Lateral epicondylitis of both elbows     ICD-10-CM: M77.11, M77.12 
ICD-9-CM: 726.32 Vitals BP Pulse Temp Resp Height(growth percentile) Weight(growth percentile) 141/78 (BP 1 Location: Left arm, BP Patient Position: Sitting) 69 98.2 °F (36.8 °C) (Oral) 16 5' 2\" (1.575 m) 204 lb (92.5 kg) LMP SpO2 BMI OB Status Smoking Status 2013 98% 37.31 kg/m2 Postmenopausal Never Smoker BMI and BSA Data Body Mass Index Body Surface Area  
 37.31 kg/m 2 2.01 m 2 Preferred Pharmacy Pharmacy Name Phone Rebecca [facility] [address] [facility] Your Updated Medication List  
  
   
This list is accurate as of 8/13/18  4:22 PM.  Always use your most recent med list.  
  
  
  
  
 Aide Derrek Take  by mouth. AMBIEN PO Take  by mouth. amLODIPine 5 mg tablet Commonly known as:  Jasvir Conine Take 5 mg by mouth daily. FLONASE 50 mcg/actuation nasal spray Generic drug:  fluticasone 2 Sprays by Both Nostrils route daily. ibuprofen 800 mg tablet Commonly known as:  MOTRIN Take  by mouth.  
  
 losartan 25 mg tablet Commonly known as:  COZAAR Take 100 mg by mouth daily. MSM PO Take  by mouth. NATURE-THROID 48.75 mg Tab Generic drug:  thyroid (pork) Take  by mouth. NexIUM 20 mg capsule Generic drug:  esomeprazole Take  by mouth daily. ZyrTEC 10 mg Cap Generic drug:  Cetirizine Take  by mouth. Follow-up Instructions Return if symptoms worsen or fail to improve. To-Do List   
 08/14/2018 12:45 PM  
  Appointment with QUENTIN PASCUAL at [address] [place] [address] ([phone]) PAYMENT  For Non-Medicare patients - $15.00 will be collected from you at the time of your exam.  You will be billed $35.00 from the reading [facility]. OUTSIDE FILMS  - Any outside films related to the study being scheduled should be brought with you on the day of the exam.  If this cannot be done there may be a delay in the reading of the study. MEDICATIONS  - Patient must bring a complete list of all medications currently taking to include prescriptions, over-the-counter meds, herbals, vitamins & any dietary supplements  GENERAL INSTRUCTIONS  - On the day of your exam do not use any bath powder, deodorant or lotions on the armpit area.   -Tenderness of breasts may cause an increase of discomfort during procedure. If you are experiencing breast tenderness on the day of your appointment and would like to reschedule, please call [phone]. Patient Instructions Tennis Elbow: Exercises Your Care Instructions Here are some examples of typical rehabilitation exercises for your condition. Start each exercise slowly. Ease off the exercise if you start to have pain. Your doctor or physical therapist will tell you when you can start these exercises and which ones will work best for you. How to do the exercises Wrist flexor stretch 1. Extend your arm in front of you with your palm up. 2. Bend your wrist, pointing your hand toward the floor. 3. With your other hand, gently bend your wrist farther until you feel a mild to moderate stretch in your forearm. 4. Hold for at least 15 to 30 seconds. Repeat 2 to 4 times. Wrist extensor stretch 1. Repeat steps 1 to 4 of the stretch above but begin with your extended hand palm down. Ball or sock squeeze 1. Hold a tennis ball (or a rolled-up sock) in your hand. 2. Make a fist around the ball (or sock) and squeeze. 3. Hold for about 6 seconds, and then relax for up to 10 seconds. 4. Repeat 8 to 12 times. 5. Switch the ball (or sock) to your other hand and do 8 to 12 times. Wrist deviation 1. Sit so that your arm is supported but your hand hangs off the edge of a flat surface, such as a table. 2. Hold your hand out like you are shaking hands with someone. 3. Move your hand up and down. 4. Repeat this motion 8 to 12 times. 5. Switch arms. 6. Try to do this exercise twice with each hand. Wrist curls 1. Place your forearm on a table with your hand hanging over the edge of the table, palm up. 2. Place a 1- to 2-pound weight in your hand. This may be a dumbbell, a can of food, or a filled water bottle. 3. Slowly raise and lower the weight while keeping your forearm on the table and your palm facing up. 4. Repeat this motion 8 to 12 times. 5. Switch arms, and do steps 1 through 4. 
6. Repeat with your hand facing down toward the floor. Switch arms. Biceps curls 1. Sit leaning forward with your legs slightly spread and your left hand on your left thigh. 2. Place your right elbow on your right thigh, and hold the weight with your forearm horizontal. 
3. Slowly curl the weight up and toward your chest. 
4. Repeat this motion 8 to 12 times. 5. Switch arms, and do steps 1 through 4. Follow-up care is a key part of your treatment and safety. Be sure to make and go to all appointments, and call your doctor if you are having problems. It's also a good idea to know your test results and keep a list of the medicines you take. Where can you learn more? Go to http://pj-adam.info/. Enter V460 in the search box to learn more about \"Tennis Elbow: Exercises. \" Current as of: November 29, 2017 Content Version: 11.7 © 3848-6995 [employer]. Care instructions adapted under license by [employer] (which disclaims liability or warranty for this information). If you have questions about a medical condition or this instruction, always ask your healthcare professional. [place] [address] any warranty or liability for your use of this information. Healthy Upper Back: Exercises Your Care Instructions Here are some examples of exercises for your upper back. Start each exercise slowly. Ease off the exercise if you start to have pain. Your doctor or physical therapist will tell you when you can start these exercises and which ones will work best for you. How to do the exercises Lower neck and upper back stretch 5. Stretch your arms out in front of your body. Clasp one hand on top of your other hand. 6. Gently reach out so that you feel your shoulder blades stretching away from each other. 7. Gently bend your head forward. 8. Hold for 15 to 30 seconds. 9. Repeat 2 to 4 times. Midback stretch If you have knee pain, do not do this exercise. 2. Kneel on the floor, and sit back on your ankles. 3. Lean forward, place your hands on the floor, and stretch your arms out in front of you. Rest your head between your arms. 4. Gently push your chest toward the floor, reaching as far in front of you as possible. 5. Hold for 15 to 30 seconds. 6. Repeat 2 to 4 times. Shoulder rolls 6. Sit comfortably with your feet shoulder-width apart. You can also do this exercise while standing. 7. Roll your shoulders up, then back, and then down in a smooth, circular motion. 8. Repeat 2 to 4 times. Wall push-up 7. Stand against a wall with your feet about 12 to 24 inches back from the wall. If you feel any pain when you do this exercise, stand closer to the wall. 8. Place your hands on the wall slightly wider apart than your shoulders, and lean forward. 9. Gently lean your body toward the wall. Then push back to your starting position. Keep the motion smooth and controlled. 10. Repeat 8 to 12 times. Resisted shoulder blade squeeze For this exercise, you will need elastic exercise material, such as surgical tubing or Thera-Band. 7. Sit or stand, holding the band in both hands in front of you. Keep your elbows close to your sides, bent at a 90-degree angle. Your palms should face up. 8. Squeeze your shoulder blades together, and move your arms to the outside, stretching the band. Be sure to keep your elbows at your sides while you do this. 9. Relax. 10. Repeat 8 to 12 times. Resisted rows For this exercise, you will need elastic exercise material, such as surgical tubing or Thera-Band. 6. Put the band around a solid object, such as a bedpost, at about waist level. Hold one end of the band in each hand. 7. With your elbows at your sides and bent to 90 degrees, pull the band back to move your shoulder blades toward each other. Return to the starting position. 8. Repeat 8 to 12 times. Follow-up care is a key part of your treatment and safety. Be sure to make and go to all appointments, and call your doctor if you are having problems. It's also a good idea to know your test results and keep a list of the medicines you take. Where can you learn more? Go to http://pj-adam.info/. Enter G017 in the search box to learn more about \"Healthy Upper Back: Exercises. \" Current as of: November 29, 2017 Content Version: 11.7 © 6653-6969 [employer]. Care instructions adapted under license by [employer] (which disclaims liability or warranty for this information). If you have questions about a medical condition or this instruction, always ask your healthcare professional. [address] any warranty or liability for your use of this information. Introducing [facility]! [employer] introduces [employer] patient portal. Now you can access parts of your medical record, email your doctor's office, and request medication refills online. 1. In your internet browser, go to https://"[employer]. [facility]/"[employer] 2. Click on the First Time User? Click Here link in the Sign In box. You will see the New Member Sign Up page. 3. Enter your [address] Access Code exactly as it appears below. You will not need to use this code after youve completed the sign-up process. If you do not sign up before the expiration date, you must request a new code. · [address] Access Code: [email or web address] Expires: 9/11/2018  1:49 PM 
 
4. Enter the last four digits of your Social Security Number (xxxx) and Date of Birth (mm/dd/yyyy) as indicated and click Submit. You will be taken to the next sign-up page. 5. Create a [employer] ID. This will be your [address] login ID and cannot be changed, so think of one that is secure and easy to remember. 6. Create a [employer] password. You can change your password at any time. 7. Enter your Password Reset Question and Answer. This can be used at a later time if you forget your password. 8. Enter your e-mail address. You will receive e-mail notification when new information is available in [address] 9. Click Sign Up. You can now view and download portions of your medical record. 10. Click the Download Summary menu link to download a portable copy of your medical information. If you have questions, please visit the Frequently Asked Questions section of the [employer] website. Remember, [employer] is NOT to be used for urgent needs. For medical emergencies, dial 911. Now available from your iPhone and Android! Please provide this summary of care documentation to your next provider. Your primary care clinician is listed as Dale Duran. If you have any questions after today's visit, please call [phone].

## 2018-08-13 NOTE — PATIENT INSTRUCTIONS
Tennis Elbow: Exercises  Your Care Instructions  Here are some examples of typical rehabilitation exercises for your condition. Start each exercise slowly. Ease off the exercise if you start to have pain. Your doctor or physical therapist will tell you when you can start these exercises and which ones will work best for you. How to do the exercises  Wrist flexor stretch    1. Extend your arm in front of you with your palm up. 2. Bend your wrist, pointing your hand toward the floor. 3. With your other hand, gently bend your wrist farther until you feel a mild to moderate stretch in your forearm. 4. Hold for at least 15 to 30 seconds. Repeat 2 to 4 times. Wrist extensor stretch    1. Repeat steps 1 to 4 of the stretch above but begin with your extended hand palm down. Ball or sock squeeze    1. Hold a tennis ball (or a rolled-up sock) in your hand. 2. Make a fist around the ball (or sock) and squeeze. 3. Hold for about 6 seconds, and then relax for up to 10 seconds. 4. Repeat 8 to 12 times. 5. Switch the ball (or sock) to your other hand and do 8 to 12 times. Wrist deviation    1. Sit so that your arm is supported but your hand hangs off the edge of a flat surface, such as a table. 2. Hold your hand out like you are shaking hands with someone. 3. Move your hand up and down. 4. Repeat this motion 8 to 12 times. 5. Switch arms. 6. Try to do this exercise twice with each hand. Wrist curls    1. Place your forearm on a table with your hand hanging over the edge of the table, palm up. 2. Place a 1- to 2-pound weight in your hand. This may be a dumbbell, a can of food, or a filled water bottle. 3. Slowly raise and lower the weight while keeping your forearm on the table and your palm facing up. 4. Repeat this motion 8 to 12 times. 5. Switch arms, and do steps 1 through 4.  6. Repeat with your hand facing down toward the floor. Switch arms. Biceps curls    1.  Sit leaning forward with your legs slightly spread and your left hand on your left thigh. 2. Place your right elbow on your right thigh, and hold the weight with your forearm horizontal.  3. Slowly curl the weight up and toward your chest.  4. Repeat this motion 8 to 12 times. 5. Switch arms, and do steps 1 through 4. Follow-up care is a key part of your treatment and safety. Be sure to make and go to all appointments, and call your doctor if you are having problems. It's also a good idea to know your test results and keep a list of the medicines you take. Where can you learn more? Go to http://pj-adam.info/. Enter Q719 in the search box to learn more about \"Tennis Elbow: Exercises. \"  Current as of: November 29, 2017  Content Version: 11.7  © 2097-6224 Healthwise, Incorporated. Care instructions adapted under license by [employer] (which disclaims liability or warranty for this information). If you have questions about a medical condition or this instruction, always ask your healthcare professional. [place] [address] any warranty or liability for your use of this information. Healthy Upper Back: Exercises  Your Care Instructions  Here are some examples of exercises for your upper back. Start each exercise slowly. Ease off the exercise if you start to have pain. Your doctor or physical therapist will tell you when you can start these exercises and which ones will work best for you. How to do the exercises  Lower neck and upper back stretch    5. Stretch your arms out in front of your body. Clasp one hand on top of your other hand. 6. Gently reach out so that you feel your shoulder blades stretching away from each other. 7. Gently bend your head forward. 8. Hold for 15 to 30 seconds. 9. Repeat 2 to 4 times. Midback stretch    If you have knee pain, do not do this exercise. 2. Kneel on the floor, and sit back on your ankles.   3. Lean forward, place your hands on the floor, and stretch your arms out in front of you. Rest your head between your arms. 4. Gently push your chest toward the floor, reaching as far in front of you as possible. 5. Hold for 15 to 30 seconds. 6. Repeat 2 to 4 times. Shoulder rolls    6. Sit comfortably with your feet shoulder-width apart. You can also do this exercise while standing. 7. Roll your shoulders up, then back, and then down in a smooth, circular motion. 8. Repeat 2 to 4 times. Wall push-up    7. Stand against a wall with your feet about 12 to 24 inches back from the wall. If you feel any pain when you do this exercise, stand closer to the wall. 8. Place your hands on the wall slightly wider apart than your shoulders, and lean forward. 9. Gently lean your body toward the wall. Then push back to your starting position. Keep the motion smooth and controlled. 10. Repeat 8 to 12 times. Resisted shoulder blade squeeze    For this exercise, you will need elastic exercise material, such as surgical tubing or Thera-Band. 7. Sit or stand, holding the band in both hands in front of you. Keep your elbows close to your sides, bent at a 90-degree angle. Your palms should face up. 8. Squeeze your shoulder blades together, and move your arms to the outside, stretching the band. Be sure to keep your elbows at your sides while you do this. 9. Relax. 10. Repeat 8 to 12 times. Resisted rows    For this exercise, you will need elastic exercise material, such as surgical tubing or Thera-Band. 6. Put the band around a solid object, such as a bedpost, at about waist level. Hold one end of the band in each hand. 7. With your elbows at your sides and bent to 90 degrees, pull the band back to move your shoulder blades toward each other. Return to the starting position. 8. Repeat 8 to 12 times. Follow-up care is a key part of your treatment and safety. Be sure to make and go to all appointments, and call your doctor if you are having problems.  It's also a good idea to know your test results and keep a list of the medicines you take. Where can you learn more? Go to http://pj-adam.info/. Enter Y801 in the search box to learn more about \"Healthy Upper Back: Exercises. \"  Current as of: November 29, 2017  Content Version: 11.7  © 0168-4018 [employer], [employer]orporated. Care instructions adapted under license by [employer] (which disclaims liability or warranty for this information). If you have questions about a medical condition or this instruction, always ask your healthcare professional. [address] any warranty or liability for your use of this information.

## 2018-08-13 NOTE — PROGRESS NOTES
[address]  [address], [address]  [place], [address] [place] [address] Street  Phone: [phone]  Fax: [phone]        Magda Becerra  : 1960  PCP: Gisel Mo MD    PROGRESS NOTE      ASSESSMENT AND PLAN    Diagnoses and all orders for this visit:    1. Bilateral carpal tunnel syndrome    2. Lateral epicondylitis of both elbows    3. Cervical spinal stenosis, relative MRI 2017       1. Advised to continue HEP. 2. Continue Aleve PRN  3. Given HEP tennis elbow  4. Discussed proper cervical ergonomics and compensatory exercise. Follow-up Disposition:  Return if symptoms worsen or fail to improve. HISTORY OF PRESENT ILLNESS  Obdulio Hand is a 56 y.o. female. RHD. Pt presents to the office for an EMG f/u. Pt has mild CTS bilaterally, she has been using wrist splints which help some. No radiculopathy. Pt reports elbow tendonitis that hurts worse than CTS. Pt has plan to receive injections 18 for CTS with Dr. Suman Merino. Pt reports performing HEP. She states she does not sleep well due to hand and elbow pain. She states the pain is relieved by heat under covers, but she will also have hot flashes during the night. Thus she oscillates between two uncomfortable conditions. Location of pain: neck into BUE  Does pain radiate into extremities: BUE  Does patient have weakness: R hand   Pt denies saddle paresthesias. Medications pt is on: Pt uses cold compress and heat with relief. Aleve with benefit. Pt denies recent ED visits or hospitalizations. Denies persistent fevers, chills, weight changes, neurogenic bowel or bladder symptoms. Treatments patient has tried:  Physical therapy:Yes cervical 2017  Non-opioid medications: Yes Failed Topamax and Gabapentin  Spinal injections: Yes lumbar block   Spinal surgery- No.        reviewed. [place] with much computer work with ergonomic workstation.     Pain Assessment  2018   Location of Pain Arm   Location Modifiers Left;Right   Severity of Pain 4   Quality of Pain Dull;Aching; Sharp   Quality of Pain Comment -   Duration of Pain Persistent   Frequency of Pain Constant   Aggravating Factors Bending;Stretching   Aggravating Factors Comment -   Limiting Behavior No   Relieving Factors Rest;NSAID   Relieving Factors Comment -   Result of Injury No   Type of Injury -       PAST MEDICAL HISTORY   Past Medical History:   Diagnosis Date    Endocrine disease     Hypothyroid    GERD (gastroesophageal reflux disease)     Hypertension     Thyroid disease        Past Surgical History:   Procedure Laterality Date    HX OOPHORECTOMY Right 07/2011    HX OVARIAN CYST REMOVAL      HX TONSILLECTOMY     . MEDICATIONS      Current Outpatient Prescriptions   Medication Sig Dispense Refill    amLODIPine (NORVASC) 5 mg tablet Take 5 mg by mouth daily.  naproxen sodium (ALEVE PO) Take  by mouth.  thyroid, pork, (NATURE-THROID) 48.75 mg tab Take  by mouth.  fluticasone (FLONASE) 50 mcg/actuation nasal spray 2 Sprays by Both Nostrils route daily.  esomeprazole (NEXIUM) 20 mg capsule Take  by mouth daily.  METHYLSULFONYLMETHANE (MSM PO) Take  by mouth.  ZOLPIDEM TARTRATE (AMBIEN PO) Take  by mouth.  losartan (COZAAR) 25 mg tablet Take 100 mg by mouth daily.  Cetirizine (ZYRTEC) 10 mg cap Take  by mouth.  ibuprofen (MOTRIN) 800 mg tablet Take  by mouth. ALLERGIES    Allergies   Allergen Reactions    Gabapentin Other (comments)     Blurred Vision    Pcn [Penicillins] Unknown (comments)    Percocet [Oxycodone-Acetaminophen] Rash    Sulfa (Sulfonamide Antibiotics) Unknown (comments)    Topamax [Topiramate] Other (comments)     Blurred vision          SOCIAL HISTORY    Social History     Social History    Marital status:      Spouse name: N/A    Number of children: N/A    Years of education: N/A     Occupational History    Not on file.      Social History Main Topics    Smoking status: Never Smoker    Smokeless tobacco: Never Used    Alcohol use No    Drug use: Not on file    Sexual activity: Not on file     Other Topics Concern    Not on file     Social History Narrative       FAMILY HISTORY    Family History   Problem Relation Age of Onset    Heart Attack Mother     Diabetes Father     Stroke Father     Bleeding Prob Father     Breast Cancer Maternal Aunt        REVIEW OF SYSTEMS  Review of Systems   Constitutional: Negative for chills, fever and weight loss. Respiratory: Negative for shortness of breath. Cardiovascular: Negative for chest pain. Gastrointestinal: Negative for constipation. Negative for fecal incontinence   Genitourinary: Negative for dysuria. Negative for urinary incontinence   Musculoskeletal:        Per HPI   Skin: Negative for rash. Neurological: Positive for tingling and focal weakness. Negative for dizziness, tremors and headaches. Endo/Heme/Allergies: Does not bruise/bleed easily. Psychiatric/Behavioral: The patient does not have insomnia. PHYSICAL EXAMINATION  Visit Vitals    /78 (BP 1 Location: Left arm, BP Patient Position: Sitting)    Pulse 69    Temp 98.2 °F (36.8 °C) (Oral)    Resp 16    Ht 5' 2\" (1.575 m)    Wt 204 lb (92.5 kg)    LMP 04/01/2013    SpO2 98%    BMI 37.31 kg/m2         Accompanied by self. Constitutional:  Well developed, well nourished, in no acute distress. Psychiatric: Affect and mood are appropriate. Integumentary: No rashes or abrasions noted on exposed areas. Cardiovascular/Peripheral Vascular: Intact l pulses. No peripheral edema is noted. Lymphatic:  No evidence of lymphedema. No cervical lymphadenopathy. SPINE/MUSCULOSKELETAL EXAM    Cervical spine:  Neck is midline. Normal muscle tone. No focal atrophy is noted. Tenderness to palpation none. Negative Spurling's sign. Negative Tinel's sign. Negative Covarrubias's sign.      Sensation grossly intact to light touch. MOTOR:      Biceps  Triceps Deltoids Wrist Ext Wrist Flex Hand Intrin   Right +4/5 +4/5 +4/5 +4/5 +4/5 4/5   Left +4/5 +4/5 +4/5 +4/5 +4/5 +4/5     Pinch intact. Ambulation without assistive device. FWB. Written by Joe Garza, as dictated by Criss Stern MD.    I, Dr. Criss Stern MD, confirm that all documentation is accurate. Ms. Marcus Yang may have a reminder for a \"due or due soon\" health maintenance. I have asked that she contact her primary care provider for follow-up on this health maintenance.

## 2018-08-14 ENCOUNTER — HOSPITAL ENCOUNTER (OUTPATIENT)
Dept: MAMMOGRAPHY | Age: 58
Discharge: HOME | End: 2018-08-14
Attending: OBSTETRICS & GYNECOLOGY
Payer: COMMERCIAL

## 2018-08-14 DIAGNOSIS — Z12.31 VISIT FOR SCREENING MAMMOGRAM: ICD-10-CM

## 2018-08-14 PROCEDURE — 77063 BREAST TOMOSYNTHESIS BI: CPT

## 2019-10-05 ENCOUNTER — HOSPITAL ENCOUNTER (OUTPATIENT)
Dept: MAMMOGRAPHY | Age: 59
Discharge: HOME | End: 2019-10-05
Attending: OBSTETRICS & GYNECOLOGY
Payer: COMMERCIAL

## 2019-10-05 DIAGNOSIS — Z12.31 VISIT FOR SCREENING MAMMOGRAM: ICD-10-CM

## 2019-10-05 PROCEDURE — 77063 BREAST TOMOSYNTHESIS BI: CPT

## 2019-10-28 ENCOUNTER — HOSPITAL ENCOUNTER (OUTPATIENT)
Dept: MAMMOGRAPHY | Age: 59
Discharge: HOME | End: 2019-10-28
Attending: OBSTETRICS & GYNECOLOGY
Payer: COMMERCIAL

## 2019-10-28 ENCOUNTER — HOSPITAL ENCOUNTER (OUTPATIENT)
Dept: ULTRASOUND IMAGING | Age: 59
Discharge: HOME | End: 2019-10-28
Attending: OBSTETRICS & GYNECOLOGY
Payer: COMMERCIAL

## 2019-10-28 DIAGNOSIS — N63.0 BREAST NODULE: ICD-10-CM

## 2019-10-28 DIAGNOSIS — R92.8 ABNORMAL SCREENING MAMMOGRAM: ICD-10-CM

## 2019-10-28 PROCEDURE — 76642 ULTRASOUND BREAST LIMITED: CPT

## 2019-10-28 PROCEDURE — 77061 BREAST TOMOSYNTHESIS UNI: CPT

## 2019-12-31 ENCOUNTER — HOSPITAL ENCOUNTER (OUTPATIENT)
Age: 59
Discharge: HOME | End: 2019-12-31
Attending: OBSTETRICS & GYNECOLOGY
Payer: COMMERCIAL

## 2019-12-31 DIAGNOSIS — N63.20 LEFT BREAST MASS: ICD-10-CM

## 2019-12-31 DIAGNOSIS — Z85.3 HISTORY OF BREAST CANCER: ICD-10-CM

## 2019-12-31 LAB — CREAT UR-MCNC: 0.9 MG/DL (ref 0.6–1.3)

## 2019-12-31 PROCEDURE — 82565 ASSAY OF CREATININE: CPT

## 2019-12-31 PROCEDURE — 77049 MRI BREAST C-+ W/CAD BI: CPT

## 2019-12-31 PROCEDURE — A9577 INJ MULTIHANCE: HCPCS | Performed by: OBSTETRICS & GYNECOLOGY

## 2019-12-31 PROCEDURE — 74011250636 HC RX REV CODE- 250/636: Performed by: OBSTETRICS & GYNECOLOGY

## 2019-12-31 RX ADMIN — GADOBENATE DIMEGLUMINE 20 ML: 529 INJECTION, SOLUTION INTRAVENOUS at 10:25

## 2020-04-17 ENCOUNTER — HOSPITAL ENCOUNTER (OUTPATIENT)
Dept: ULTRASOUND IMAGING | Age: 60
Discharge: HOME | End: 2020-04-17
Attending: OBSTETRICS & GYNECOLOGY
Payer: COMMERCIAL

## 2020-04-17 ENCOUNTER — HOSPITAL ENCOUNTER (OUTPATIENT)
Dept: MAMMOGRAPHY | Age: 60
Discharge: HOME | End: 2020-04-17
Attending: OBSTETRICS & GYNECOLOGY
Payer: COMMERCIAL

## 2020-04-17 DIAGNOSIS — R92.8 ABNORMAL MAMMOGRAM: ICD-10-CM

## 2020-04-17 DIAGNOSIS — N63.0 BREAST MASS: ICD-10-CM

## 2020-04-17 DIAGNOSIS — Z09 FOLLOW-UP EXAM: ICD-10-CM

## 2020-04-17 PROCEDURE — 76642 ULTRASOUND BREAST LIMITED: CPT

## 2020-04-17 PROCEDURE — 77061 BREAST TOMOSYNTHESIS UNI: CPT

## 2020-10-19 ENCOUNTER — HOSPITAL ENCOUNTER (OUTPATIENT)
Dept: MAMMOGRAPHY | Age: 60
Discharge: HOME | End: 2020-10-19
Attending: OBSTETRICS & GYNECOLOGY
Payer: COMMERCIAL

## 2020-10-19 ENCOUNTER — HOSPITAL ENCOUNTER (OUTPATIENT)
Dept: ULTRASOUND IMAGING | Age: 60
Discharge: HOME | End: 2020-10-19
Attending: OBSTETRICS & GYNECOLOGY
Payer: COMMERCIAL

## 2020-10-19 DIAGNOSIS — Z09 FOLLOW-UP EXAM: ICD-10-CM

## 2020-10-19 DIAGNOSIS — N60.09 BREAST CYST: ICD-10-CM

## 2020-10-19 DIAGNOSIS — R92.8 ABNORMAL MAMMOGRAM: ICD-10-CM

## 2020-10-19 PROCEDURE — 76642 ULTRASOUND BREAST LIMITED: CPT

## 2020-10-19 PROCEDURE — 77062 BREAST TOMOSYNTHESIS BI: CPT

## 2021-09-17 ENCOUNTER — TRANSCRIBE ORDER (OUTPATIENT)
Dept: SCHEDULING | Age: 61
End: 2021-09-17

## 2021-09-17 DIAGNOSIS — R92.8 ABNORMAL SCREENING MAMMOGRAM: Primary | ICD-10-CM

## 2021-10-20 ENCOUNTER — HOSPITAL ENCOUNTER (OUTPATIENT)
Dept: MAMMOGRAPHY | Age: 61
Discharge: HOME | End: 2021-10-20
Attending: OBSTETRICS & GYNECOLOGY
Payer: COMMERCIAL

## 2021-10-20 ENCOUNTER — HOSPITAL ENCOUNTER (OUTPATIENT)
Dept: ULTRASOUND IMAGING | Age: 61
Discharge: HOME | End: 2021-10-20
Attending: OBSTETRICS & GYNECOLOGY
Payer: COMMERCIAL

## 2021-10-20 DIAGNOSIS — R92.8 ABNORMAL SCREENING MAMMOGRAM: ICD-10-CM

## 2021-10-20 PROCEDURE — 77062 BREAST TOMOSYNTHESIS BI: CPT

## 2021-10-20 PROCEDURE — 76642 ULTRASOUND BREAST LIMITED: CPT

## 2022-01-06 ENCOUNTER — HOSPITAL ENCOUNTER (OUTPATIENT)
Dept: LAB | Age: 62
Discharge: HOME | End: 2022-01-06
Payer: COMMERCIAL

## 2022-01-06 LAB — SARS-COV-2, NAA: NOT DETECTED

## 2022-01-06 PROCEDURE — U0003 INFECTIOUS AGENT DETECTION BY NUCLEIC ACID (DNA OR RNA); SEVERE ACUTE RESPIRATORY SYNDROME CORONAVIRUS 2 (SARS-COV-2) (CORONAVIRUS DISEASE [COVID-19]), AMPLIFIED PROBE TECHNIQUE, MAKING USE OF HIGH THROUGHPUT TECHNOLOGIES AS DESCRIBED BY CMS-2020-01-R: HCPCS

## 2022-03-18 PROBLEM — M54.12 LEFT CERVICAL RADICULOPATHY: Status: ACTIVE | Noted: 2017-09-20

## 2022-03-18 PROBLEM — M48.02 CERVICAL SPINAL STENOSIS: Status: ACTIVE | Noted: 2018-06-13

## 2022-03-19 PROBLEM — M77.12 LATERAL EPICONDYLITIS OF BOTH ELBOWS: Status: ACTIVE | Noted: 2018-08-13

## 2022-03-19 PROBLEM — M77.11 LATERAL EPICONDYLITIS OF BOTH ELBOWS: Status: ACTIVE | Noted: 2018-08-13

## 2022-03-19 PROBLEM — G56.03 BILATERAL CARPAL TUNNEL SYNDROME: Status: ACTIVE | Noted: 2018-08-13

## 2022-03-19 PROBLEM — M54.12 CERVICAL RADICULOPATHY: Status: ACTIVE | Noted: 2018-06-13

## 2022-03-20 PROBLEM — R20.2 PARESTHESIA OF UPPER EXTREMITY: Status: ACTIVE | Noted: 2018-06-13

## 2022-09-21 ENCOUNTER — TRANSCRIBE ORDER (OUTPATIENT)
Dept: SCHEDULING | Age: 62
End: 2022-09-21

## 2022-09-21 DIAGNOSIS — Z12.31 VISIT FOR SCREENING MAMMOGRAM: Primary | ICD-10-CM

## 2022-10-24 ENCOUNTER — HOSPITAL ENCOUNTER (OUTPATIENT)
Dept: MAMMOGRAPHY | Age: 62
Discharge: HOME | End: 2022-10-24
Attending: OBSTETRICS & GYNECOLOGY
Payer: COMMERCIAL

## 2022-10-24 DIAGNOSIS — Z12.31 VISIT FOR SCREENING MAMMOGRAM: ICD-10-CM

## 2022-10-24 PROCEDURE — 77063 BREAST TOMOSYNTHESIS BI: CPT

## 2023-08-04 NOTE — MR AVS SNAPSHOT
[address] [facility] [address] [place] [address] 
[phone] Patient: Izaiah Vidales MRN: [id number] :1960 Visit Information Date & Time Provider Department Dept. Phone Encounter #  
 2018  3:00 PM Irvin King MD [facility] [phone] [id number] Follow-up Instructions Return with Dr. Shelia Hernandez. Upcoming Health Maintenance Date Due Hepatitis C Screening 1960 DTaP/Tdap/Td series (1 - Tdap) 1983 PAP AKA CERVICAL CYTOLOGY 1983 FOBT Q 1 YEAR AGE 50-75 2012 Influenza Age 5 to Adult 2018 BREAST CANCER SCRN MAMMOGRAM 2019 Allergies as of 2018  Review Complete On: 2018 By: Irvin King MD  
  
 Severity Noted Reaction Type Reactions Gabapentin  2017    Other (comments) Blurred Vision Pcn [Penicillins]  2012    Unknown (comments) Percocet [Oxycodone-acetaminophen]  2012    Rash  
 Sulfa (Sulfonamide Antibiotics)  2012    Unknown (comments) Current Immunizations  Never Reviewed No immunizations on file. Not reviewed this visit You Were Diagnosed With   
  
 Codes Comments Numbness and tingling in both hands    -  Primary ICD-10-CM: R20.0, R20.2 ICD-9-CM: 757. 0 Vitals BP Pulse Temp Resp Height(growth percentile) Weight(growth percentile) 112/77 69 98 °F (36.7 °C) (Oral) 18 5' 2.75\" (1.594 m) 197 lb (89.4 kg) BMI Smoking Status 35.18 kg/m2 Never Smoker BMI and BSA Data Body Mass Index Body Surface Area  
 35.18 kg/m 2 1.99 m 2 Preferred Pharmacy Pharmacy Name Phone [facility] [phone] [facility] Your Updated Medication List  
  
   
This list is accurate as of 18  4:33 PM.  Always use your most recent med list.  
  
  
  
  
 Mane [place] Take  by mouth. Patient notified of Negative Gc/Ct/Tv results by Portal.  AMBIEN PO Take  by mouth. FLONASE 50 mcg/actuation nasal spray Generic drug:  fluticasone 2 Sprays by Both Nostrils route daily. ibuprofen 800 mg tablet Commonly known as:  MOTRIN Take  by mouth.  
  
 losartan 25 mg tablet Commonly known as:  COZAAR Take 100 mg by mouth daily. MSM PO Take  by mouth. NATURE-THROID 48.75 mg Tab Generic drug:  thyroid (pork) Take  by mouth. NexIUM 20 mg capsule Generic drug:  esomeprazole Take  by mouth daily. * topiramate 25 mg tablet Commonly known as:  TOPAMAX Take 1 tab PO QHS x1 week then may increase to 2 tabs PO QHS * topiramate 25 mg tablet Commonly known as:  TOPAMAX  
1 to 2 tabs PO QHS ZyrTEC 10 mg Cap Generic drug:  Cetirizine Take  by mouth. * Notice: This list has 2 medication(s) that are the same as other medications prescribed for you. Read the directions carefully, and ask your doctor or other care provider to review them with you. Follow-up Instructions Return with Dr. Dedra Glez. Patient Instructions Tennis Elbow: Care Instructions Your Care Instructions Tennis elbow is soreness or pain on the outer part of the elbow. The pain occurs when the tendon is stretched and becomes irritated by repeated twisting of the hand, wrist, and forearm. A tendon is a tough tissue that connects muscle to bone. This injury is common in tennis players. But you also can get it from many activities that work the same muscles. Examples include gardening, painting, and using tools. Tennis elbow usually heals with rest and treatment at home. Follow-up care is a key part of your treatment and safety. Be sure to make and go to all appointments, and call your doctor if you are having problems. It's also a good idea to know your test results and keep a list of the medicines you take. How can you care for yourself at home?   · Rest your fingers, wrist, and forearm. Try to stop or reduce any activity that causes elbow pain. You may have to rest your arm for weeks to months. Follow your doctor's directions for how long to rest.  
  · Put ice or a cold pack on your elbow for 10 to 20 minutes at a time. Try to do this every 1 to 2 hours for the next 3 days (when you are awake) or until the swelling goes down. Put a thin cloth between the ice and your skin.  
  · If your doctor gave you a brace or splint, use it as directed. A \"counterforce\" brace is a strap around your forearm, just below your elbow. It may ease the pressure on the tendon and spread force throughout your arm.  
  · Prop up your elbow on pillows to help reduce swelling.  
  · Follow your doctor's or physical therapist's directions for exercise.  
  · Return to your usual activities slowly.  
  · Try to prevent the problem. Learn the best techniques for your sport. For example, make sure the  on your tennis racquet is not too big for your hand. Try not to hit a tennis ball late in your swing.  
  · Think about asking your employer about new ways of doing your job if your elbow pain is caused by something you do at work. Medicines 
  · Be safe with medicines. Read and follow all instructions on the label. ¨ If the doctor gave you a prescription medicine for pain, take it as prescribed. ¨ If you are not taking a prescription pain medicine, ask your doctor if you can take an over-the-counter medicine. When should you call for help? Call your doctor now or seek immediate medical care if: 
  · Your pain is worse.  
  · You cannot bend your elbow normally.  
  · Your arm or hand is cool or pale or changes color.  
  · You have tingling, weakness, or numbness in your hand and fingers.  
 Watch closely for changes in your health, and be sure to contact your doctor if: 
  · You have work problems caused by your elbow pain.  
  · Your pain is not better after 2 weeks. Where can you learn more? Go to http://pj-adam.info/. Enter 0699 465 17 25 in the search box to learn more about \"Tennis Elbow: Care Instructions. \" Current as of: November 29, 2017 Content Version: 11.7 © 1568-6705 [employer]. Care instructions adapted under license by [employer] (which disclaims liability or warranty for this information). If you have questions about a medical condition or this instruction, always ask your healthcare professional. [place] [address] any warranty or liability for your use of this information. Tennis Elbow: Exercises Your Care Instructions Here are some examples of typical rehabilitation exercises for your condition. Start each exercise slowly. Ease off the exercise if you start to have pain. Your doctor or physical therapist will tell you when you can start these exercises and which ones will work best for you. How to do the exercises Wrist flexor stretch 1. Extend your arm in front of you with your palm up. 2. Bend your wrist, pointing your hand toward the floor. 3. With your other hand, gently bend your wrist farther until you feel a mild to moderate stretch in your forearm. 4. Hold for at least 15 to 30 seconds. Repeat 2 to 4 times. Wrist extensor stretch 1. Repeat steps 1 to 4 of the stretch above but begin with your extended hand palm down. Ball or sock squeeze 1. Hold a tennis ball (or a rolled-up sock) in your hand. 2. Make a fist around the ball (or sock) and squeeze. 3. Hold for about 6 seconds, and then relax for up to 10 seconds. 4. Repeat 8 to 12 times. 5. Switch the ball (or sock) to your other hand and do 8 to 12 times. Wrist deviation 1. Sit so that your arm is supported but your hand hangs off the edge of a flat surface, such as a table. 2. Hold your hand out like you are shaking hands with someone. 3. Move your hand up and down. 4. Repeat this motion 8 to 12 times. 5. Switch arms. 6. Try to do this exercise twice with each hand. Wrist curls 1. Place your forearm on a table with your hand hanging over the edge of the table, palm up. 2. Place a 1- to 2-pound weight in your hand. This may be a dumbbell, a can of food, or a filled water bottle. 3. Slowly raise and lower the weight while keeping your forearm on the table and your palm facing up. 4. Repeat this motion 8 to 12 times. 5. Switch arms, and do steps 1 through 4. 
6. Repeat with your hand facing down toward the floor. Switch arms. Biceps curls 1. Sit leaning forward with your legs slightly spread and your left hand on your left thigh. 2. Place your right elbow on your right thigh, and hold the weight with your forearm horizontal. 
3. Slowly curl the weight up and toward your chest. 
4. Repeat this motion 8 to 12 times. 5. Switch arms, and do steps 1 through 4. Follow-up care is a key part of your treatment and safety. Be sure to make and go to all appointments, and call your doctor if you are having problems. It's also a good idea to know your test results and keep a list of the medicines you take. Where can you learn more? Go to http://pj-adam.info/. Enter E111 in the search box to learn more about \"Tennis Elbow: Exercises. \" Current as of: November 29, 2017 Content Version: 11.7 © 8481-1391 [email or web address], [employer]. Care instructions adapted under license by [employer] (which disclaims liability or warranty for this information). If you have questions about a medical condition or this instruction, always ask your healthcare professional. [place] [address] any warranty or liability for your use of this information. Introducing [facility]! [facility] introduces Sparkbuy patient portal. Now you can access parts of your medical record, email your doctor's office, and request medication refills online.    
 
1. In your internet browser, go to [email or web address] 2. Click on the First Time User? Click Here link in the Sign In box. You will see the New Member Sign Up page. 3. Enter your Team Apart Access Code exactly as it appears below. You will not need to use this code after youve completed the sign-up process. If you do not sign up before the expiration date, you must request a new code. · Team Apart Access Code: [email or web address] Expires: 9/11/2018  1:49 PM 
 
4. Enter the last four digits of your Social Security Number (xxxx) and Date of Birth (mm/dd/yyyy) as indicated and click Submit. You will be taken to the next sign-up page. 5. Create a [a]list gamest ID. This will be your Team Apart login ID and cannot be changed, so think of one that is secure and easy to remember. 6. Create a Team Apart password. You can change your password at any time. 7. Enter your Password Reset Question and Answer. This can be used at a later time if you forget your password. 8. Enter your e-mail address. You will receive e-mail notification when new information is available in [address] 9. Click Sign Up. You can now view and download portions of your medical record. 10. Click the Download Summary menu link to download a portable copy of your medical information. If you have questions, please visit the Frequently Asked Questions section of the Team Apart website. Remember, Team Apart is NOT to be used for urgent needs. For medical emergencies, dial 911. Now available from your iPhone and Android! Please provide this summary of care documentation to your next provider. Your primary care clinician is listed as Micheal Jason. If you have any questions after today's visit, please call [phone].

## 2023-10-05 ENCOUNTER — TRANSCRIBE ORDERS (OUTPATIENT)
Facility: HOSPITAL | Age: 63
End: 2023-10-05

## 2023-10-05 DIAGNOSIS — Z12.31 VISIT FOR SCREENING MAMMOGRAM: Primary | ICD-10-CM

## 2023-11-29 ENCOUNTER — HOSPITAL ENCOUNTER (OUTPATIENT)
Facility: HOSPITAL | Age: 63
Discharge: HOME | End: 2023-12-02
Attending: INTERNAL MEDICINE
Payer: COMMERCIAL

## 2023-11-29 VITALS — HEIGHT: 62 IN | BODY MASS INDEX: 33.13 KG/M2 | WEIGHT: 180 LBS

## 2023-11-29 DIAGNOSIS — Z12.31 VISIT FOR SCREENING MAMMOGRAM: ICD-10-CM

## 2023-11-29 PROCEDURE — 77063 BREAST TOMOSYNTHESIS BI: CPT

## 2024-04-03 ENCOUNTER — HOSPITAL ENCOUNTER (OUTPATIENT)
Facility: HOSPITAL | Age: 64
Discharge: HOME | End: 2024-04-06
Payer: COMMERCIAL

## 2024-04-03 DIAGNOSIS — Z01.419 ENCOUNTER FOR ANNUAL ROUTINE GYNECOLOGICAL EXAMINATION: ICD-10-CM

## 2024-04-03 DIAGNOSIS — Z13.820 SCREENING FOR OSTEOPOROSIS: ICD-10-CM

## 2024-04-03 PROCEDURE — 77080 DXA BONE DENSITY AXIAL: CPT

## 2024-08-23 ENCOUNTER — OFFICE VISIT (OUTPATIENT)
Age: 64
End: 2024-08-23
Payer: COMMERCIAL

## 2024-08-23 VITALS
WEIGHT: 195 LBS | HEIGHT: 63 IN | OXYGEN SATURATION: 100 % | DIASTOLIC BLOOD PRESSURE: 70 MMHG | BODY MASS INDEX: 34.55 KG/M2 | SYSTOLIC BLOOD PRESSURE: 126 MMHG | HEART RATE: 72 BPM

## 2024-08-23 DIAGNOSIS — R06.02 SOB (SHORTNESS OF BREATH): ICD-10-CM

## 2024-08-23 DIAGNOSIS — R00.2 PALPITATIONS: Primary | ICD-10-CM

## 2024-08-23 PROCEDURE — 93000 ELECTROCARDIOGRAM COMPLETE: CPT | Performed by: INTERNAL MEDICINE

## 2024-08-23 PROCEDURE — 99204 OFFICE O/P NEW MOD 45 MIN: CPT | Performed by: INTERNAL MEDICINE

## 2024-08-23 ASSESSMENT — ANXIETY QUESTIONNAIRES
7. FEELING AFRAID AS IF SOMETHING AWFUL MIGHT HAPPEN: NOT AT ALL
5. BEING SO RESTLESS THAT IT IS HARD TO SIT STILL: NOT AT ALL
2. NOT BEING ABLE TO STOP OR CONTROL WORRYING: NOT AT ALL
3. WORRYING TOO MUCH ABOUT DIFFERENT THINGS: NOT AT ALL
GAD7 TOTAL SCORE: 0
6. BECOMING EASILY ANNOYED OR IRRITABLE: NOT AT ALL
IF YOU CHECKED OFF ANY PROBLEMS ON THIS QUESTIONNAIRE, HOW DIFFICULT HAVE THESE PROBLEMS MADE IT FOR YOU TO DO YOUR WORK, TAKE CARE OF THINGS AT HOME, OR GET ALONG WITH OTHER PEOPLE: NOT DIFFICULT AT ALL
4. TROUBLE RELAXING: NOT AT ALL
1. FEELING NERVOUS, ANXIOUS, OR ON EDGE: NOT AT ALL

## 2024-08-23 ASSESSMENT — PATIENT HEALTH QUESTIONNAIRE - PHQ9
SUM OF ALL RESPONSES TO PHQ QUESTIONS 1-9: 0
SUM OF ALL RESPONSES TO PHQ QUESTIONS 1-9: 0
2. FEELING DOWN, DEPRESSED OR HOPELESS: NOT AT ALL
SUM OF ALL RESPONSES TO PHQ QUESTIONS 1-9: 0
SUM OF ALL RESPONSES TO PHQ QUESTIONS 1-9: 0
SUM OF ALL RESPONSES TO PHQ9 QUESTIONS 1 & 2: 0
1. LITTLE INTEREST OR PLEASURE IN DOING THINGS: NOT AT ALL

## 2024-08-23 NOTE — PROGRESS NOTES
Dorota Velazquez presents today for   Chief Complaint   Patient presents with    New Patient     Self referred        Dorotajose manuel SutherlandVelazquez preferred language for health care discussion is english/other.    Is someone accompanying this pt? no    Is the patient using any DME equipment during OV? no    Depression Screening:  Depression: Not at risk (8/23/2024)    PHQ-2     PHQ-2 Score: 0        Learning Assessment:  No question data found.       Pt currently taking Anticoagulant therapy? no    Pt currently taking Antiplatelet therapy ? no      Coordination of Care:  1. Have you been to the ER, urgent care clinic since your last visit? Hospitalized since your last visit? no    2. Have you seen or consulted any other health care providers outside of the [facility] since your last visit? Include any pap smears or colon screening. no

## 2024-08-26 NOTE — PROGRESS NOTES
Dorota Velazquez    Chief Complaint   Patient presents with    New Patient     Self referred        HPI    Dorota Velazquez is a 63 y.o. female self refers due to SOB and palpitations.    She has seen my partner 10 yrs ago for this same complaint. Went to ED at that time. Thought to be benign palpitations, did not complete testing and was lost to follow up. She has HTN and thyroid disease. Her TSH as well as other labs have been WNL.    Feels like heart is fluttering, sometimes pounding in her chest. It is not painful, but sometimes a \"thump.\" She can feel excessively SOB with exertion, but not really a/w the palps. Otherwise no CP, no syncope, no edema.      Past Medical History:   Diagnosis Date    Endocrine disease     Hypothyroid    GERD (gastroesophageal reflux disease)     Hypertension     Thyroid disease        Past Surgical History:   Procedure Laterality Date    HYSTERECTOMY (CERVIX STATUS UNKNOWN)      OVARIAN CYST REMOVAL      OVARY REMOVAL Right 07/2011    TONSILLECTOMY         Current Outpatient Medications   Medication Sig Dispense Refill    esomeprazole (NEXIUM) 20 MG delayed release capsule Take by mouth daily      fluticasone (FLONASE) 50 MCG/ACT nasal spray 2 sprays by Nasal route daily      ibuprofen (ADVIL;MOTRIN) 800 MG tablet Take by mouth      losartan (COZAAR) 25 MG tablet Take 4 tablets by mouth daily      Thyroid 48.75 MG TABS Take 60 mg by mouth daily       No current facility-administered medications for this visit.       Allergies   Allergen Reactions    Gabapentin Other (See Comments)     Blurred Vision    Penicillins      Other reaction(s): Unknown (comments)    Sulfa Antibiotics      Other reaction(s): Unknown (comments)    Topiramate Other (See Comments)     Blurred vision    Oxycodone-Acetaminophen Rash       Social History     Socioeconomic History    Marital status:      Spouse name: Not on file    Number of children: Not on file    Years of education: Not on file     Highest education level: Not on file   Occupational History    Not on file   Tobacco Use    Smoking status: Never    Smokeless tobacco: Never   Substance and Sexual Activity    Alcohol use: No    Drug use: Not on file    Sexual activity: Not on file   Other Topics Concern    Not on file   Social History Narrative    Not on file     Social Determinants of Health     Financial Resource Strain: Not on file   Food Insecurity: Not on file   Transportation Needs: Not on file   Physical Activity: Not on file   Stress: Not on file   Social Connections: Not on file   Intimate Partner Violence: Not on file   Housing Stability: Not on file        FH: father stroke, mother HTN    Review of Systems    14 pt Review of Systems is negative unless otherwise mentioned in the HPI.    Wt Readings from Last 3 Encounters:   08/23/24 88.5 kg (195 lb)   11/29/23 81.6 kg (180 lb)     Temp Readings from Last 3 Encounters:   No data found for Temp     BP Readings from Last 3 Encounters:   08/23/24 126/70     Pulse Readings from Last 3 Encounters:   08/23/24 72       Physical Exam:    /70 (Site: Left Upper Arm, Position: Sitting, Cuff Size: Medium Adult)   Pulse 72   Ht 1.6 m (5' 3\")   Wt 88.5 kg (195 lb)   SpO2 100%   BMI 34.54 kg/m²    Physical Exam  Vitals and nursing note reviewed.   Constitutional:       General: She is not in acute distress.     Appearance: Normal appearance.   HENT:      Head: Normocephalic.      Nose: Nose normal.      Mouth/Throat:      Mouth: Mucous membranes are moist.   Eyes:      Extraocular Movements: Extraocular movements intact.      Pupils: Pupils are equal, round, and reactive to light.   Neck:      Vascular: No carotid bruit.   Cardiovascular:      Rate and Rhythm: Normal rate and regular rhythm.      Pulses: Normal pulses.      Heart sounds: No murmur heard.     No friction rub. No gallop.   Pulmonary:      Effort: Pulmonary effort is normal.      Breath sounds: Normal breath sounds. No wheezing

## 2024-09-11 ENCOUNTER — TELEPHONE (OUTPATIENT)
Age: 64
End: 2024-09-11

## 2024-09-27 ENCOUNTER — TELEPHONE (OUTPATIENT)
Age: 64
End: 2024-09-27

## 2024-11-06 ENCOUNTER — TELEPHONE (OUTPATIENT)
Age: 64
End: 2024-11-06

## 2024-11-06 NOTE — TELEPHONE ENCOUNTER
----- Message from Dr. Diana Damian DO sent at 10/30/2024  3:05 PM EDT -----  Monitor is benign with rare palpitaitons  ----- Message -----  From: Ani Ware, TROY  Sent: 10/24/2024   5:31 PM EDT  To: Diana Damian DO    Per your last office note:    Palpitations  SOB  HTN  Thyroid     Labs reviewed, WNL  30 day MCT  Echo  Will call with results, if WNL can see me as needed  Further recs to follow

## 2024-12-02 ENCOUNTER — HOSPITAL ENCOUNTER (OUTPATIENT)
Facility: HOSPITAL | Age: 64
Discharge: HOME | End: 2024-12-05
Payer: COMMERCIAL

## 2024-12-02 VITALS — HEIGHT: 63 IN | WEIGHT: 190 LBS | BODY MASS INDEX: 33.66 KG/M2

## 2024-12-02 DIAGNOSIS — Z12.31 BREAST CANCER SCREENING BY MAMMOGRAM: ICD-10-CM

## 2024-12-02 PROCEDURE — 77063 BREAST TOMOSYNTHESIS BI: CPT
